# Patient Record
Sex: FEMALE | NOT HISPANIC OR LATINO
[De-identification: names, ages, dates, MRNs, and addresses within clinical notes are randomized per-mention and may not be internally consistent; named-entity substitution may affect disease eponyms.]

---

## 2017-02-25 ENCOUNTER — APPOINTMENT (OUTPATIENT)
Dept: INTERNAL MEDICINE | Facility: CLINIC | Age: 82
End: 2017-02-25

## 2017-09-05 ENCOUNTER — TRANSCRIPTION ENCOUNTER (OUTPATIENT)
Age: 82
End: 2017-09-05

## 2017-09-07 ENCOUNTER — INPATIENT (INPATIENT)
Facility: HOSPITAL | Age: 82
LOS: 5 days | Discharge: HOME HEALTH | End: 2017-09-13
Attending: HOSPITALIST | Admitting: HOSPITALIST
Payer: MEDICARE

## 2017-09-07 VITALS
DIASTOLIC BLOOD PRESSURE: 72 MMHG | OXYGEN SATURATION: 100 % | HEART RATE: 115 BPM | RESPIRATION RATE: 18 BRPM | TEMPERATURE: 99 F | SYSTOLIC BLOOD PRESSURE: 144 MMHG

## 2017-09-07 DIAGNOSIS — E87.1 HYPO-OSMOLALITY AND HYPONATREMIA: ICD-10-CM

## 2017-09-07 DIAGNOSIS — G93.41 METABOLIC ENCEPHALOPATHY: ICD-10-CM

## 2017-09-07 DIAGNOSIS — I48.91 UNSPECIFIED ATRIAL FIBRILLATION: ICD-10-CM

## 2017-09-07 DIAGNOSIS — H40.9 UNSPECIFIED GLAUCOMA: ICD-10-CM

## 2017-09-07 DIAGNOSIS — E83.42 HYPOMAGNESEMIA: ICD-10-CM

## 2017-09-07 DIAGNOSIS — A41.9 SEPSIS, UNSPECIFIED ORGANISM: ICD-10-CM

## 2017-09-07 DIAGNOSIS — Z29.9 ENCOUNTER FOR PROPHYLACTIC MEASURES, UNSPECIFIED: ICD-10-CM

## 2017-09-07 DIAGNOSIS — R80.8 OTHER PROTEINURIA: ICD-10-CM

## 2017-09-07 DIAGNOSIS — I10 ESSENTIAL (PRIMARY) HYPERTENSION: ICD-10-CM

## 2017-09-07 DIAGNOSIS — R31.9 HEMATURIA, UNSPECIFIED: ICD-10-CM

## 2017-09-07 DIAGNOSIS — E11.9 TYPE 2 DIABETES MELLITUS WITHOUT COMPLICATIONS: ICD-10-CM

## 2017-09-07 DIAGNOSIS — Z98.890 OTHER SPECIFIED POSTPROCEDURAL STATES: Chronic | ICD-10-CM

## 2017-09-07 DIAGNOSIS — I25.10 ATHEROSCLEROTIC HEART DISEASE OF NATIVE CORONARY ARTERY WITHOUT ANGINA PECTORIS: ICD-10-CM

## 2017-09-07 DIAGNOSIS — N39.0 URINARY TRACT INFECTION, SITE NOT SPECIFIED: ICD-10-CM

## 2017-09-07 LAB
ACANTHOCYTES BLD QL SMEAR: SLIGHT — SIGNIFICANT CHANGE UP
ALBUMIN SERPL ELPH-MCNC: 3 G/DL — LOW (ref 3.3–5)
ALP SERPL-CCNC: 81 U/L — SIGNIFICANT CHANGE UP (ref 40–120)
ALT FLD-CCNC: 29 U/L — SIGNIFICANT CHANGE UP (ref 4–33)
ANISOCYTOSIS BLD QL: SLIGHT — SIGNIFICANT CHANGE UP
APPEARANCE UR: CLEAR — SIGNIFICANT CHANGE UP
AST SERPL-CCNC: 34 U/L — HIGH (ref 4–32)
BACTERIA # UR AUTO: SIGNIFICANT CHANGE UP
BASE EXCESS BLDV CALC-SCNC: -0.3 MMOL/L — SIGNIFICANT CHANGE UP
BASOPHILS # BLD AUTO: 0.02 K/UL — SIGNIFICANT CHANGE UP (ref 0–0.2)
BASOPHILS NFR BLD AUTO: 0.2 % — SIGNIFICANT CHANGE UP (ref 0–2)
BASOPHILS NFR SPEC: 0.9 % — SIGNIFICANT CHANGE UP (ref 0–2)
BILIRUB SERPL-MCNC: 0.7 MG/DL — SIGNIFICANT CHANGE UP (ref 0.2–1.2)
BILIRUB UR-MCNC: NEGATIVE — SIGNIFICANT CHANGE UP
BLOOD GAS VENOUS - CREATININE: 1.06 MG/DL — SIGNIFICANT CHANGE UP (ref 0.5–1.3)
BLOOD UR QL VISUAL: HIGH
BUN SERPL-MCNC: 10 MG/DL — SIGNIFICANT CHANGE UP (ref 7–23)
BUN SERPL-MCNC: 12 MG/DL — SIGNIFICANT CHANGE UP (ref 7–23)
BUN SERPL-MCNC: 19 MG/DL — SIGNIFICANT CHANGE UP (ref 7–23)
BURR CELLS BLD QL SMEAR: PRESENT — SIGNIFICANT CHANGE UP
CALCIUM SERPL-MCNC: 6.2 MG/DL — CRITICAL LOW (ref 8.4–10.5)
CALCIUM SERPL-MCNC: 8.3 MG/DL — LOW (ref 8.4–10.5)
CALCIUM SERPL-MCNC: 9 MG/DL — SIGNIFICANT CHANGE UP (ref 8.4–10.5)
CHLORIDE BLDV-SCNC: 89 MMOL/L — LOW (ref 96–108)
CHLORIDE SERPL-SCNC: 104 MMOL/L — SIGNIFICANT CHANGE UP (ref 98–107)
CHLORIDE SERPL-SCNC: 88 MMOL/L — LOW (ref 98–107)
CHLORIDE SERPL-SCNC: 92 MMOL/L — LOW (ref 98–107)
CK MB BLD-MCNC: 1.65 NG/ML — SIGNIFICANT CHANGE UP (ref 1–4.7)
CK MB BLD-MCNC: 1.9 NG/ML — SIGNIFICANT CHANGE UP (ref 1–4.7)
CK MB BLD-MCNC: SIGNIFICANT CHANGE UP (ref 0–2.5)
CK SERPL-CCNC: 106 U/L — SIGNIFICANT CHANGE UP (ref 25–170)
CK SERPL-CCNC: 68 U/L — SIGNIFICANT CHANGE UP (ref 25–170)
CO2 SERPL-SCNC: 19 MMOL/L — LOW (ref 22–31)
CO2 SERPL-SCNC: 21 MMOL/L — LOW (ref 22–31)
CO2 SERPL-SCNC: 23 MMOL/L — SIGNIFICANT CHANGE UP (ref 22–31)
COLOR SPEC: YELLOW — SIGNIFICANT CHANGE UP
CREAT SERPL-MCNC: 0.69 MG/DL — SIGNIFICANT CHANGE UP (ref 0.5–1.3)
CREAT SERPL-MCNC: 0.92 MG/DL — SIGNIFICANT CHANGE UP (ref 0.5–1.3)
CREAT SERPL-MCNC: 1.08 MG/DL — SIGNIFICANT CHANGE UP (ref 0.5–1.3)
EOSINOPHIL # BLD AUTO: 0 K/UL — SIGNIFICANT CHANGE UP (ref 0–0.5)
EOSINOPHIL NFR BLD AUTO: 0 % — SIGNIFICANT CHANGE UP (ref 0–6)
EOSINOPHIL NFR FLD: 0 % — SIGNIFICANT CHANGE UP (ref 0–6)
GAS PNL BLDV: 120 MMOL/L — CRITICAL LOW (ref 136–146)
GIANT PLATELETS BLD QL SMEAR: PRESENT — SIGNIFICANT CHANGE UP
GLUCOSE BLDV-MCNC: 170 — HIGH (ref 70–99)
GLUCOSE SERPL-MCNC: 141 MG/DL — HIGH (ref 70–99)
GLUCOSE SERPL-MCNC: 159 MG/DL — HIGH (ref 70–99)
GLUCOSE SERPL-MCNC: 177 MG/DL — HIGH (ref 70–99)
GLUCOSE UR-MCNC: NEGATIVE — SIGNIFICANT CHANGE UP
HBA1C BLD-MCNC: 6.7 % — HIGH (ref 4–5.6)
HCO3 BLDV-SCNC: 24 MMOL/L — SIGNIFICANT CHANGE UP (ref 20–27)
HCT VFR BLD CALC: 25.1 % — LOW (ref 34.5–45)
HCT VFR BLD CALC: 29.5 % — LOW (ref 34.5–45)
HCT VFR BLD CALC: 31.2 % — LOW (ref 34.5–45)
HCT VFR BLDV CALC: 33.5 % — LOW (ref 34.5–45)
HGB BLD-MCNC: 10 G/DL — LOW (ref 11.5–15.5)
HGB BLD-MCNC: 10.7 G/DL — LOW (ref 11.5–15.5)
HGB BLD-MCNC: 8.5 G/DL — LOW (ref 11.5–15.5)
HGB BLDV-MCNC: 10.9 G/DL — LOW (ref 11.5–15.5)
HYPOCHROMIA BLD QL: SIGNIFICANT CHANGE UP
IMM GRANULOCYTES # BLD AUTO: 0.04 # — SIGNIFICANT CHANGE UP
IMM GRANULOCYTES NFR BLD AUTO: 0.4 % — SIGNIFICANT CHANGE UP (ref 0–1.5)
KETONES UR-MCNC: SIGNIFICANT CHANGE UP
LACTATE BLDV-MCNC: 1.8 MMOL/L — SIGNIFICANT CHANGE UP (ref 0.5–2)
LEUKOCYTE ESTERASE UR-ACNC: HIGH
LYMPHOCYTES # BLD AUTO: 0.54 K/UL — LOW (ref 1–3.3)
LYMPHOCYTES # BLD AUTO: 4.9 % — LOW (ref 13–44)
LYMPHOCYTES NFR SPEC AUTO: 5.2 % — LOW (ref 13–44)
MACROCYTES BLD QL: SLIGHT — SIGNIFICANT CHANGE UP
MAGNESIUM SERPL-MCNC: 1 MG/DL — CRITICAL LOW (ref 1.6–2.6)
MAGNESIUM SERPL-MCNC: 1.4 MG/DL — LOW (ref 1.6–2.6)
MCHC RBC-ENTMCNC: 25.8 PG — LOW (ref 27–34)
MCHC RBC-ENTMCNC: 26.1 PG — LOW (ref 27–34)
MCHC RBC-ENTMCNC: 26.4 PG — LOW (ref 27–34)
MCHC RBC-ENTMCNC: 33.9 % — SIGNIFICANT CHANGE UP (ref 32–36)
MCHC RBC-ENTMCNC: 33.9 % — SIGNIFICANT CHANGE UP (ref 32–36)
MCHC RBC-ENTMCNC: 34.3 % — SIGNIFICANT CHANGE UP (ref 32–36)
MCV RBC AUTO: 76 FL — LOW (ref 80–100)
MCV RBC AUTO: 76.8 FL — LOW (ref 80–100)
MCV RBC AUTO: 77 FL — LOW (ref 80–100)
METHOD TYPE: SIGNIFICANT CHANGE UP
MICROCYTES BLD QL: SLIGHT — SIGNIFICANT CHANGE UP
MONOCYTES # BLD AUTO: 1.21 K/UL — HIGH (ref 0–0.9)
MONOCYTES NFR BLD AUTO: 10.9 % — SIGNIFICANT CHANGE UP (ref 2–14)
MONOCYTES NFR BLD: 4.4 % — SIGNIFICANT CHANGE UP (ref 2–9)
MUCOUS THREADS # UR AUTO: SIGNIFICANT CHANGE UP
NEUTROPHIL AB SER-ACNC: 86 % — HIGH (ref 43–77)
NEUTROPHILS # BLD AUTO: 9.28 K/UL — HIGH (ref 1.8–7.4)
NEUTROPHILS NFR BLD AUTO: 83.6 % — HIGH (ref 43–77)
NEUTS BAND # BLD: 3.5 % — SIGNIFICANT CHANGE UP (ref 0–6)
NITRITE UR-MCNC: NEGATIVE — SIGNIFICANT CHANGE UP
NRBC # FLD: 0 — SIGNIFICANT CHANGE UP
ORGANISM # SPEC MICROSCOPIC CNT: SIGNIFICANT CHANGE UP
ORGANISM # SPEC MICROSCOPIC CNT: SIGNIFICANT CHANGE UP
OVALOCYTES BLD QL SMEAR: SLIGHT — SIGNIFICANT CHANGE UP
PCO2 BLDV: 38 MMHG — LOW (ref 41–51)
PH BLDV: 7.41 PH — SIGNIFICANT CHANGE UP (ref 7.32–7.43)
PH UR: 7 — SIGNIFICANT CHANGE UP (ref 4.6–8)
PLATELET # BLD AUTO: 147 K/UL — LOW (ref 150–400)
PLATELET # BLD AUTO: 170 K/UL — SIGNIFICANT CHANGE UP (ref 150–400)
PLATELET # BLD AUTO: 170 K/UL — SIGNIFICANT CHANGE UP (ref 150–400)
PLATELET COUNT - ESTIMATE: NORMAL — SIGNIFICANT CHANGE UP
PMV BLD: 11.4 FL — SIGNIFICANT CHANGE UP (ref 7–13)
PMV BLD: 11.5 FL — SIGNIFICANT CHANGE UP (ref 7–13)
PMV BLD: 11.9 FL — SIGNIFICANT CHANGE UP (ref 7–13)
PO2 BLDV: 36 MMHG — SIGNIFICANT CHANGE UP (ref 35–40)
POIKILOCYTOSIS BLD QL AUTO: SLIGHT — SIGNIFICANT CHANGE UP
POTASSIUM BLDV-SCNC: 3.8 MMOL/L — SIGNIFICANT CHANGE UP (ref 3.4–4.5)
POTASSIUM SERPL-MCNC: 2.9 MMOL/L — CRITICAL LOW (ref 3.5–5.3)
POTASSIUM SERPL-MCNC: 3.6 MMOL/L — SIGNIFICANT CHANGE UP (ref 3.5–5.3)
POTASSIUM SERPL-MCNC: 3.8 MMOL/L — SIGNIFICANT CHANGE UP (ref 3.5–5.3)
POTASSIUM SERPL-SCNC: 2.9 MMOL/L — CRITICAL LOW (ref 3.5–5.3)
POTASSIUM SERPL-SCNC: 3.6 MMOL/L — SIGNIFICANT CHANGE UP (ref 3.5–5.3)
POTASSIUM SERPL-SCNC: 3.8 MMOL/L — SIGNIFICANT CHANGE UP (ref 3.5–5.3)
PROT SERPL-MCNC: 7 G/DL — SIGNIFICANT CHANGE UP (ref 6–8.3)
PROT UR-MCNC: 100 — HIGH
RBC # BLD: 3.26 M/UL — LOW (ref 3.8–5.2)
RBC # BLD: 3.88 M/UL — SIGNIFICANT CHANGE UP (ref 3.8–5.2)
RBC # BLD: 4.06 M/UL — SIGNIFICANT CHANGE UP (ref 3.8–5.2)
RBC # FLD: 15.7 % — HIGH (ref 10.3–14.5)
RBC # FLD: 16 % — HIGH (ref 10.3–14.5)
RBC # FLD: 16.1 % — HIGH (ref 10.3–14.5)
RBC CASTS # UR COMP ASSIST: HIGH (ref 0–?)
SAO2 % BLDV: 70.9 % — SIGNIFICANT CHANGE UP (ref 60–85)
SCHISTOCYTES BLD QL AUTO: SLIGHT — SIGNIFICANT CHANGE UP
SODIUM SERPL-SCNC: 125 MMOL/L — LOW (ref 135–145)
SODIUM SERPL-SCNC: 127 MMOL/L — LOW (ref 135–145)
SODIUM SERPL-SCNC: 135 MMOL/L — SIGNIFICANT CHANGE UP (ref 135–145)
SP GR SPEC: 1.01 — SIGNIFICANT CHANGE UP (ref 1–1.03)
SPECIMEN SOURCE: SIGNIFICANT CHANGE UP
SQUAMOUS # UR AUTO: SIGNIFICANT CHANGE UP
TROPONIN T SERPL-MCNC: < 0.06 NG/ML — SIGNIFICANT CHANGE UP (ref 0–0.06)
TROPONIN T SERPL-MCNC: < 0.06 NG/ML — SIGNIFICANT CHANGE UP (ref 0–0.06)
UROBILINOGEN FLD QL: NORMAL E.U. — SIGNIFICANT CHANGE UP (ref 0.1–0.2)
WBC # BLD: 10.85 K/UL — HIGH (ref 3.8–10.5)
WBC # BLD: 11.09 K/UL — HIGH (ref 3.8–10.5)
WBC # BLD: 9.02 K/UL — SIGNIFICANT CHANGE UP (ref 3.8–10.5)
WBC # FLD AUTO: 10.85 K/UL — HIGH (ref 3.8–10.5)
WBC # FLD AUTO: 11.09 K/UL — HIGH (ref 3.8–10.5)
WBC # FLD AUTO: 9.02 K/UL — SIGNIFICANT CHANGE UP (ref 3.8–10.5)
WBC UR QL: SIGNIFICANT CHANGE UP (ref 0–?)

## 2017-09-07 PROCEDURE — 99223 1ST HOSP IP/OBS HIGH 75: CPT

## 2017-09-07 PROCEDURE — 71010: CPT | Mod: 26

## 2017-09-07 PROCEDURE — 93010 ELECTROCARDIOGRAM REPORT: CPT

## 2017-09-07 RX ORDER — METFORMIN HYDROCHLORIDE 850 MG/1
1 TABLET ORAL
Qty: 0 | Refills: 0 | COMMUNITY

## 2017-09-07 RX ORDER — METOPROLOL TARTRATE 50 MG
25 TABLET ORAL ONCE
Qty: 0 | Refills: 0 | Status: COMPLETED | OUTPATIENT
Start: 2017-09-07 | End: 2017-09-07

## 2017-09-07 RX ORDER — AMLODIPINE BESYLATE 2.5 MG/1
5 TABLET ORAL DAILY
Qty: 0 | Refills: 0 | Status: DISCONTINUED | OUTPATIENT
Start: 2017-09-07 | End: 2017-09-13

## 2017-09-07 RX ORDER — INSULIN LISPRO 100/ML
VIAL (ML) SUBCUTANEOUS AT BEDTIME
Qty: 0 | Refills: 0 | Status: DISCONTINUED | OUTPATIENT
Start: 2017-09-07 | End: 2017-09-13

## 2017-09-07 RX ORDER — ASPIRIN/CALCIUM CARB/MAGNESIUM 324 MG
81 TABLET ORAL DAILY
Qty: 0 | Refills: 0 | Status: DISCONTINUED | OUTPATIENT
Start: 2017-09-07 | End: 2017-09-13

## 2017-09-07 RX ORDER — SODIUM CHLORIDE 9 MG/ML
1000 INJECTION, SOLUTION INTRAVENOUS
Qty: 0 | Refills: 0 | Status: DISCONTINUED | OUTPATIENT
Start: 2017-09-07 | End: 2017-09-13

## 2017-09-07 RX ORDER — DEXTROSE 50 % IN WATER 50 %
25 SYRINGE (ML) INTRAVENOUS ONCE
Qty: 0 | Refills: 0 | Status: DISCONTINUED | OUTPATIENT
Start: 2017-09-07 | End: 2017-09-13

## 2017-09-07 RX ORDER — METOPROLOL TARTRATE 50 MG
75 TABLET ORAL
Qty: 0 | Refills: 0 | Status: DISCONTINUED | OUTPATIENT
Start: 2017-09-07 | End: 2017-09-07

## 2017-09-07 RX ORDER — IPRATROPIUM BROMIDE 0.2 MG/ML
500 SOLUTION, NON-ORAL INHALATION ONCE
Qty: 0 | Refills: 0 | Status: COMPLETED | OUTPATIENT
Start: 2017-09-07 | End: 2017-09-07

## 2017-09-07 RX ORDER — GLUCAGON INJECTION, SOLUTION 0.5 MG/.1ML
1 INJECTION, SOLUTION SUBCUTANEOUS ONCE
Qty: 0 | Refills: 0 | Status: DISCONTINUED | OUTPATIENT
Start: 2017-09-07 | End: 2017-09-13

## 2017-09-07 RX ORDER — IPRATROPIUM BROMIDE 0.2 MG/ML
500 SOLUTION, NON-ORAL INHALATION EVERY 6 HOURS
Qty: 0 | Refills: 0 | Status: DISCONTINUED | OUTPATIENT
Start: 2017-09-07 | End: 2017-09-08

## 2017-09-07 RX ORDER — PIPERACILLIN AND TAZOBACTAM 4; .5 G/20ML; G/20ML
3.38 INJECTION, POWDER, LYOPHILIZED, FOR SOLUTION INTRAVENOUS ONCE
Qty: 0 | Refills: 0 | Status: COMPLETED | OUTPATIENT
Start: 2017-09-07 | End: 2017-09-07

## 2017-09-07 RX ORDER — LATANOPROST 0.05 MG/ML
1 SOLUTION/ DROPS OPHTHALMIC; TOPICAL AT BEDTIME
Qty: 0 | Refills: 0 | Status: DISCONTINUED | OUTPATIENT
Start: 2017-09-07 | End: 2017-09-13

## 2017-09-07 RX ORDER — SODIUM CHLORIDE 9 MG/ML
2400 INJECTION INTRAMUSCULAR; INTRAVENOUS; SUBCUTANEOUS ONCE
Qty: 0 | Refills: 0 | Status: COMPLETED | OUTPATIENT
Start: 2017-09-07 | End: 2017-09-07

## 2017-09-07 RX ORDER — LANOLIN ALCOHOL/MO/W.PET/CERES
3 CREAM (GRAM) TOPICAL AT BEDTIME
Qty: 0 | Refills: 0 | Status: DISCONTINUED | OUTPATIENT
Start: 2017-09-07 | End: 2017-09-13

## 2017-09-07 RX ORDER — DEXTROSE 50 % IN WATER 50 %
1 SYRINGE (ML) INTRAVENOUS ONCE
Qty: 0 | Refills: 0 | Status: DISCONTINUED | OUTPATIENT
Start: 2017-09-07 | End: 2017-09-13

## 2017-09-07 RX ORDER — METOPROLOL TARTRATE 50 MG
5 TABLET ORAL ONCE
Qty: 0 | Refills: 0 | Status: COMPLETED | OUTPATIENT
Start: 2017-09-07 | End: 2017-09-07

## 2017-09-07 RX ORDER — INSULIN LISPRO 100/ML
VIAL (ML) SUBCUTANEOUS
Qty: 0 | Refills: 0 | Status: DISCONTINUED | OUTPATIENT
Start: 2017-09-07 | End: 2017-09-13

## 2017-09-07 RX ORDER — MAGNESIUM SULFATE 500 MG/ML
1 VIAL (ML) INJECTION ONCE
Qty: 0 | Refills: 0 | Status: COMPLETED | OUTPATIENT
Start: 2017-09-07 | End: 2017-09-07

## 2017-09-07 RX ORDER — ACETAMINOPHEN 500 MG
1000 TABLET ORAL ONCE
Qty: 0 | Refills: 0 | Status: COMPLETED | OUTPATIENT
Start: 2017-09-07 | End: 2017-09-07

## 2017-09-07 RX ORDER — DORZOLAMIDE HYDROCHLORIDE 20 MG/ML
1 SOLUTION/ DROPS OPHTHALMIC
Qty: 0 | Refills: 0 | Status: DISCONTINUED | OUTPATIENT
Start: 2017-09-07 | End: 2017-09-13

## 2017-09-07 RX ORDER — APIXABAN 2.5 MG/1
5 TABLET, FILM COATED ORAL EVERY 12 HOURS
Qty: 0 | Refills: 0 | Status: DISCONTINUED | OUTPATIENT
Start: 2017-09-07 | End: 2017-09-13

## 2017-09-07 RX ORDER — DEXTROSE 50 % IN WATER 50 %
12.5 SYRINGE (ML) INTRAVENOUS ONCE
Qty: 0 | Refills: 0 | Status: DISCONTINUED | OUTPATIENT
Start: 2017-09-07 | End: 2017-09-13

## 2017-09-07 RX ORDER — SODIUM CHLORIDE 9 MG/ML
1000 INJECTION INTRAMUSCULAR; INTRAVENOUS; SUBCUTANEOUS
Qty: 0 | Refills: 0 | Status: DISCONTINUED | OUTPATIENT
Start: 2017-09-07 | End: 2017-09-13

## 2017-09-07 RX ORDER — VANCOMYCIN HCL 1 G
1000 VIAL (EA) INTRAVENOUS ONCE
Qty: 0 | Refills: 0 | Status: COMPLETED | OUTPATIENT
Start: 2017-09-07 | End: 2017-09-07

## 2017-09-07 RX ORDER — PILOCARPINE HCL 4 %
1 DROPS OPHTHALMIC (EYE)
Qty: 0 | Refills: 0 | Status: DISCONTINUED | OUTPATIENT
Start: 2017-09-07 | End: 2017-09-13

## 2017-09-07 RX ORDER — CEFTRIAXONE 500 MG/1
1 INJECTION, POWDER, FOR SOLUTION INTRAMUSCULAR; INTRAVENOUS EVERY 24 HOURS
Qty: 0 | Refills: 0 | Status: DISCONTINUED | OUTPATIENT
Start: 2017-09-07 | End: 2017-09-08

## 2017-09-07 RX ORDER — METOPROLOL TARTRATE 50 MG
100 TABLET ORAL
Qty: 0 | Refills: 0 | Status: DISCONTINUED | OUTPATIENT
Start: 2017-09-07 | End: 2017-09-08

## 2017-09-07 RX ORDER — LISINOPRIL 2.5 MG/1
40 TABLET ORAL DAILY
Qty: 0 | Refills: 0 | Status: DISCONTINUED | OUTPATIENT
Start: 2017-09-07 | End: 2017-09-13

## 2017-09-07 RX ORDER — ATORVASTATIN CALCIUM 80 MG/1
80 TABLET, FILM COATED ORAL AT BEDTIME
Qty: 0 | Refills: 0 | Status: DISCONTINUED | OUTPATIENT
Start: 2017-09-07 | End: 2017-09-13

## 2017-09-07 RX ORDER — ACETAMINOPHEN 500 MG
650 TABLET ORAL ONCE
Qty: 0 | Refills: 0 | Status: COMPLETED | OUTPATIENT
Start: 2017-09-07 | End: 2017-09-07

## 2017-09-07 RX ADMIN — SODIUM CHLORIDE 75 MILLILITER(S): 9 INJECTION INTRAMUSCULAR; INTRAVENOUS; SUBCUTANEOUS at 10:09

## 2017-09-07 RX ADMIN — Medication 100 MILLIGRAM(S): at 17:28

## 2017-09-07 RX ADMIN — LATANOPROST 1 DROP(S): 0.05 SOLUTION/ DROPS OPHTHALMIC; TOPICAL at 21:35

## 2017-09-07 RX ADMIN — Medication 500 MICROGRAM(S): at 18:22

## 2017-09-07 RX ADMIN — ATORVASTATIN CALCIUM 80 MILLIGRAM(S): 80 TABLET, FILM COATED ORAL at 21:34

## 2017-09-07 RX ADMIN — Medication 25 MILLIGRAM(S): at 10:30

## 2017-09-07 RX ADMIN — DORZOLAMIDE HYDROCHLORIDE 1 DROP(S): 20 SOLUTION/ DROPS OPHTHALMIC at 21:35

## 2017-09-07 RX ADMIN — Medication 5 MILLIGRAM(S): at 06:04

## 2017-09-07 RX ADMIN — Medication 650 MILLIGRAM(S): at 17:28

## 2017-09-07 RX ADMIN — SODIUM CHLORIDE 75 MILLILITER(S): 9 INJECTION INTRAMUSCULAR; INTRAVENOUS; SUBCUTANEOUS at 19:14

## 2017-09-07 RX ADMIN — Medication 3 MILLIGRAM(S): at 23:26

## 2017-09-07 RX ADMIN — CEFTRIAXONE 100 GRAM(S): 500 INJECTION, POWDER, FOR SOLUTION INTRAMUSCULAR; INTRAVENOUS at 10:30

## 2017-09-07 RX ADMIN — PIPERACILLIN AND TAZOBACTAM 200 GRAM(S): 4; .5 INJECTION, POWDER, LYOPHILIZED, FOR SOLUTION INTRAVENOUS at 04:50

## 2017-09-07 RX ADMIN — Medication 250 MILLIGRAM(S): at 04:50

## 2017-09-07 RX ADMIN — Medication 500 MICROGRAM(S): at 22:15

## 2017-09-07 RX ADMIN — Medication 5 MILLIGRAM(S): at 13:20

## 2017-09-07 RX ADMIN — SODIUM CHLORIDE 800 MILLILITER(S): 9 INJECTION INTRAMUSCULAR; INTRAVENOUS; SUBCUTANEOUS at 04:38

## 2017-09-07 RX ADMIN — Medication 100 GRAM(S): at 21:34

## 2017-09-07 RX ADMIN — Medication 1 DROP(S): at 21:35

## 2017-09-07 RX ADMIN — Medication 81 MILLIGRAM(S): at 13:06

## 2017-09-07 RX ADMIN — Medication 400 MILLIGRAM(S): at 04:37

## 2017-09-07 RX ADMIN — APIXABAN 5 MILLIGRAM(S): 2.5 TABLET, FILM COATED ORAL at 17:28

## 2017-09-07 RX ADMIN — Medication 25 MILLIGRAM(S): at 13:25

## 2017-09-07 NOTE — CONSULT NOTE ADULT - SUBJECTIVE AND OBJECTIVE BOX
CHIEF COMPLAINT:   83 y/o female with PMHx of Atrial fibrillation on Eliquis, ischemic cardiomyopathy (EF 52%), HTN, HLD, Diabetes, and glaucoma presents to the ED with exertional dyspnea and confusion x 2 days. Dyspnea worsened at 12:30am while patient was walking to the bathroom. She states that she felt weak and shaky with mild palpitations and she could not catch her breath. Patient also has had a cough with white sputum production for the past 2-3 days, no sick contacts at home. Daughter reports that patient has been hallucinating intermittently, mostly in the afternoon, and that she has not been eating or drinking much due to decreased appetite and early satiety with a weight loss of 8-10lbs in the past 3 months. Patient has also been feeling warmer recently and complains of headache and dysuria for the past few days. Patient went to urgent care on 9/5/17 where all blood results were normal except for hyponatremia, but urine was not collected. Upon arrival to the ED, patient found to be in Afib with HR in the 130s and was given IV fluids (2.4L NS) and Metoprolol, HR now stable. Patient also found to have UTI and was given Vancomycin and Zosyn for possible sepsis. Blood cultures and urine cultures ordered. Denies fever, chills, night sweats, dizziness, recent falls, visual deficits, chest pain, hematuria, flank pain, abdominal pain, nausea, vomiting, diarrhea, or peripheral edema.    HISTORY OF PRESENT ILLNESS:    PAST MEDICAL & SURGICAL HISTORY:  CAD (coronary artery disease)  Type 2 diabetes mellitus without complication, unspecified long term insulin use status  Atrial fibrillation with rapid ventricular response  Glaucoma  Vertigo  HTN (hypertension)  History of eye surgery        MEDICATIONS:  aspirin enteric coated 81 milliGRAM(s) Oral daily  lisinopril 40 milliGRAM(s) Oral daily  apixaban 5 milliGRAM(s) Oral every 12 hours  amLODIPine   Tablet 5 milliGRAM(s) Oral daily  metoprolol 100 milliGRAM(s) Oral two times a day  cefTRIAXone   IVPB 1 Gram(s) IV Intermittent every 24 hours  ipratropium  for Nebulization. 500 MICROGram(s) Nebulizer once        atorvastatin 80 milliGRAM(s) Oral at bedtime  insulin lispro (HumaLOG) corrective regimen sliding scale   SubCutaneous three times a day before meals  insulin lispro (HumaLOG) corrective regimen sliding scale   SubCutaneous at bedtime  dextrose Gel 1 Dose(s) Oral once PRN  dextrose 50% Injectable 12.5 Gram(s) IV Push once  dextrose 50% Injectable 25 Gram(s) IV Push once  dextrose 50% Injectable 25 Gram(s) IV Push once  glucagon  Injectable 1 milliGRAM(s) IntraMuscular once PRN    dorzolamide 2% Ophthalmic Solution 1 Drop(s) Both EYES two times a day  pilocarpine 1% Solution 1 Drop(s) Both EYES two times a day  latanoprost 0.005% Ophthalmic Solution 1 Drop(s) Both EYES at bedtime  dextrose 5%. 1000 milliLiter(s) IV Continuous <Continuous>  sodium chloride 0.9%. 1000 milliLiter(s) IV Continuous <Continuous>      FAMILY HISTORY:  No pertinent family history in first degree relatives      SOCIAL HISTORY:    [ ] Non-smoker  [ ] Smoker  [ ] Alcohol    Allergies    No Known Allergies    Intolerances      PHYSICAL EXAM:  T(C): 36.8 (09-07-17 @ 10:03), Max: 37.4 (09-07-17 @ 03:20)  HR: 136 (09-07-17 @ 12:45) (97 - 136)  BP: 156/84 (09-07-17 @ 12:45) (134/76 - 170/93)  RR: 25 (09-07-17 @ 12:45) (18 - 31)  SpO2: 100% (09-07-17 @ 12:45) (96% - 100%)  Wt(kg): --  I&O's Summary      Appearance: Normal	  HEENT:   Normal oral mucosa, PERRL, EOMI	  Cardiovascular: Normal S1 S2, No JVD, No murmurs, No edema, irregularlt irregular  Respiratory: scattered exp wheezing  Gastrointestinal:  Soft, Non-tender, + BS  Extremities: Normal range of motion, No clubbing, cyanosis or edema    CKMB: 1.65 ng/mL (09-07 @ 04:00)                              8.5   9.02  )-----------( 147      ( 07 Sep 2017 14:32 )             25.1     09-07    125<L>  |  88<L>  |  19  ----------------------------<  159<H>  3.8   |  21<L>  |  1.08    Ca    9.0      07 Sep 2017 04:00    TPro  7.0  /  Alb  3.0<L>  /  TBili  0.7  /  DBili  x   /  AST  34<H>  /  ALT  29  /  AlkPhos  81  09-07    proBNP:   Lipid Profile:   HgA1c: Hemoglobin A1C, Whole Blood: 6.7 % (09-07 @ 03:44)    TSH:     ASSESSMENT/PLAN:

## 2017-09-07 NOTE — ED PROVIDER NOTE - MEDICAL DECISION MAKING DETAILS
83 yo F w/ PMH of Diabetes, HTN, and A fib presenting with exertional dyspnea and altered mental status. Concern for sepsis. Will order sepsis bundle. Begin fluids and antibiotics. Patient to be admitted.

## 2017-09-07 NOTE — H&P ADULT - HISTORY OF PRESENT ILLNESS
81 y/o female with PMHx of Atrial fibrillation on Eliquis, ischemic cardiomyopathy (EF 52%), HTN, HLD, Diabetes, and glaucoma presents to the ED with exertional dyspnea and confusion x 2 days. Dyspnea worsened at 12:30am while patient was walking to the bathroom. She states that she felt weak and shaky with mild palpitations and she could not catch her breath. Patient also has had a cough with white sputum production for the past 2-3 days, no sick contacts at home. Daughter reports that patient has been hallucinating intermittently, mostly in the afternoon, and that she has not been eating or drinking much due to decreased appetite and early satiety with a weight loss of 8-10lbs in the past 3 months. Patient has also been feeling warmer recently and complains of headache and dysuria for the past few days. Patient went to urgent care on 9/5/17 where all blood results were normal except for hyponatremia, but urine was not collected. Upon arrival to the ED, patient found to be in Afib with HR in the 130s and was given IV fluids (2.4L NS) and Metoprolol, HR now stable. Patient also found to have UTI and was given Vancomycin and Zosyn for possible sepsis. Blood cultures and urine cultures ordered. Denies fever, chills, night sweats, dizziness, recent falls, visual deficits, chest pain, hematuria, flank pain, abdominal pain, nausea, vomiting, diarrhea, or peripheral edema.

## 2017-09-07 NOTE — ED ADULT NURSE NOTE - CHIEF COMPLAINT QUOTE
pt arrives via EMS w/ c/o weakness x 3 days. pt states has been having abdominal pain with nausea and vomiting when coughing. pt warm to touch and states has been feeling warm at home but never took temp. pt pmh a fib in eliquis, DM. pt appears comfortable and in NAD. FSx 161    addend: Charge RN aware pt starting to get SOb with rapid breathing.

## 2017-09-07 NOTE — ED ADULT NURSE NOTE - OBJECTIVE STATEMENT
Code sepsis called. Patient bought in room 12 via stretcher accompanied by family. Patient reports weakness, fatigue and shortness of breath last night while walking to the bathroom. Patient is alert and oriented x 4, tachypneic, wheezing noted, tachycardic on cardiac monitor, abdomen soft and nontender. +1 edema noted on bilateral lower extremities.20 gauge saline lock placed on left AC, 20 gauge saline lock placed on right metacarpal, blood drawn and sent. Urinalysis and urine culture sent. Will follow up and monitor.

## 2017-09-07 NOTE — PROVIDER CONTACT NOTE (CRITICAL VALUE NOTIFICATION) - ASSESSMENT
Vital signs stable. Tele monitoring in place. Patient in no acute distress. NS 0.9% infusing as per orders. Vital signs stable. Tele monitoring in place. Patient in no acute distress.

## 2017-09-07 NOTE — H&P ADULT - PROBLEM SELECTOR PLAN 2
Likely multifactorial due to UTI, hyponatremia, and Afib with RVR  Currently AAOx3 Likely multifactorial due to UTI, hyponatremia, and Afib with RVR  Currently AAOx3, currently resolved, no need for head CT

## 2017-09-07 NOTE — H&P ADULT - PROBLEM SELECTOR PLAN 1
Admit to telemetry  HR currently stable  Cardiac consult ordered  Echo ordered  Continue Metoprolol and Eliquis Admit to telemetry  HR currently stable  Cardiac consult ordered  Echo ordered  Continue Metoprolol and Eliquis  CHADS score 4

## 2017-09-07 NOTE — CONSULT NOTE ADULT - ASSESSMENT
83 y/o female with PMHx of Atrial fibrillation on Eliquis, ischemic cardiomyopathy (EF 52%), HTN, HLD, Diabetes, and glaucoma presents to the ED with exertional dyspnea and confusion x 2 days.

## 2017-09-07 NOTE — ED ADULT TRIAGE NOTE - CHIEF COMPLAINT QUOTE
pt arrives via EMS w/ c/o weakness x 3 days. pt states has been having abdominal pain with nausea and vomiting when coughing. pt warm to touch and states has been feeling warm at home but never took temp. pt pmh a fib in eliquis, DM. pt appears comfortable and in NAD. FSx 161 pt arrives via EMS w/ c/o weakness x 3 days. pt states has been having abdominal pain with nausea and vomiting when coughing. pt warm to touch and states has been feeling warm at home but never took temp. pt pmh a fib in eliquis, DM. pt appears comfortable and in NAD. FSx 161    addend: Charge RN aware pt starting to get SOb with rapid breathing.

## 2017-09-07 NOTE — PHYSICAL THERAPY INITIAL EVALUATION ADULT - GENERAL OBSERVATIONS, REHAB EVAL
Patient received supine in bed; No apparent distress. (+) oxygen via Nasal Canula, heplock. Daughter at bedside

## 2017-09-07 NOTE — ED PROVIDER NOTE - CONSTITUTIONAL, MLM
normal... Well nourished, awake, alert, oriented to person, place, time/situation and in no apparent distress. Patient AAO x 3, but had to spend a long time to give current month and year.

## 2017-09-07 NOTE — H&P ADULT - ASSESSMENT
81 y/o female with PMHx of atrial fibrillation on Eliquis, CAD (medically managed), ischemic cardiomyopathy with mild LV dysfunction (EF 52%), HTN, HLD, DM, and glaucoma presents to ED with shortness of breath and confusion for 2 days, found to be in rapid atrial fibrillation complicated by hyponatremia and possible UTI.

## 2017-09-07 NOTE — ED PROVIDER NOTE - ATTENDING CONTRIBUTION TO CARE
I performed a face to face bedside interview with patient regarding history of present illness, review of symptoms and past medical history. I completed an independent physical exam.  I have discussed patient's plan of care.   I agree with note as stated above, having amended the EMR as needed to reflect my findings. I have discussed the assessment and plan of care.  This includes during the time I functioned as the attending physician for this patient.  Attending Contribution to Care:agree with plan of resident. pt p/w sepsis secondary to uti. stable otherwise. vss. labs with mild leukocytosis. stable for admission. vss

## 2017-09-07 NOTE — CONSULT NOTE ADULT - ASSESSMENT
EKG - Afib RVR LVH  Echo 2016 - mild global LV dysfunction   Cath 2016 - Normal LM, LAD LCX RCA, D1 ostial 90%, treated medically     1) Afib RVR - continue eliquis, heart rate under better control, s/p lopressor IV 5mg , cont lopressor 100mg q12, repeat 2D echo    2_ UTI - on ceftriaxone     3) Wheezing - pulm consult

## 2017-09-07 NOTE — PROGRESS NOTE ADULT - SUBJECTIVE AND OBJECTIVE BOX
Patient is a 82y old  Female who presents with a chief complaint of SOB and confusion. Unable to walk (07 Sep 2017 10:36)    Called by RN to evaluate pt. with elevated heart rate on tele.     Vital Signs Last 24 Hrs  T(C): 36.8 (07 Sep 2017 10:03), Max: 37.4 (07 Sep 2017 03:20)  T(F): 98.2 (07 Sep 2017 10:03), Max: 99.3 (07 Sep 2017 03:20)  HR: 97 (07 Sep 2017 10:03) (97 - 132)  BP: 145/96 (07 Sep 2017 10:03) (134/76 - 170/93)  BP(mean): --  RR: 19 (07 Sep 2017 10:03) (18 - 31)  SpO2: 100% (07 Sep 2017 10:03) (96% - 100%)  CARDIAC MARKERS ( 07 Sep 2017 04:00 )  x     / < 0.06 ng/mL / 106 u/L / 1.65 ng/mL / x                                  10.7   11.09 )-----------( 170      ( 07 Sep 2017 04:00 )             31.2     09-07    125<L>  |  88<L>  |  19  ----------------------------<  159<H>  3.8   |  21<L>  |  1.08    Ca    9.0      07 Sep 2017 04:00    TPro  7.0  /  Alb  3.0<L>  /  TBili  0.7  /  DBili  x   /  AST  34<H>  /  ALT  29  /  AlkPhos  81  09-07    LIVER FUNCTIONS - ( 07 Sep 2017 04:00 )  Alb: 3.0 g/dL / Pro: 7.0 g/dL / ALK PHOS: 81 u/L / ALT: 29 u/L / AST: 34 u/L / GGT: x                                   CAPILLARY BLOOD GLUCOSE  147 (07 Sep 2017 11:39)  167 (07 Sep 2017 08:37)        aspirin enteric coated 81 milliGRAM(s) Oral daily  lisinopril 40 milliGRAM(s) Oral daily  atorvastatin 80 milliGRAM(s) Oral at bedtime  dorzolamide 2% Ophthalmic Solution 1 Drop(s) Both EYES two times a day  pilocarpine 1% Solution 1 Drop(s) Both EYES two times a day  apixaban 5 milliGRAM(s) Oral every 12 hours  latanoprost 0.005% Ophthalmic Solution 1 Drop(s) Both EYES at bedtime  amLODIPine   Tablet 5 milliGRAM(s) Oral daily  insulin lispro (HumaLOG) corrective regimen sliding scale   SubCutaneous three times a day before meals  insulin lispro (HumaLOG) corrective regimen sliding scale   SubCutaneous at bedtime  dextrose 5%. 1000 milliLiter(s) IV Continuous <Continuous>  dextrose Gel 1 Dose(s) Oral once PRN  dextrose 50% Injectable 12.5 Gram(s) IV Push once  dextrose 50% Injectable 25 Gram(s) IV Push once  dextrose 50% Injectable 25 Gram(s) IV Push once  glucagon  Injectable 1 milliGRAM(s) IntraMuscular once PRN  cefTRIAXone   IVPB 1 Gram(s) IV Intermittent every 24 hours  sodium chloride 0.9%. 1000 milliLiter(s) IV Continuous <Continuous>  metoprolol 100 milliGRAM(s) Oral two times a day      RADIOLOGY :    Assessment: Patient 82y with Sepsis  CAD (coronary artery disease)  Type 2 diabetes mellitus without complication, unspecified long term insulin use status  Atrial fibrillation with rapid ventricular response  Glaucoma  Vertigo  HTN (hypertension)  No pertinent past medical history  History of eye surgery  No significant past surgical history

## 2017-09-07 NOTE — H&P ADULT - ATTENDING COMMENTS
83 yo F w/ Afib on Eliquis, ICM (EF 52%, cath 11/2016), HTN, HLD, NIDDM presents with confusion, exertional dyspnea, and dysuria x 2 days. Per pt's daughter, pt was recently diagnosed with DM a few months ago and started on metformin, and since then has been having decreased appetite, nausea, and vomiting. Pt states that her PMD decreased metformin from BID to QD last month. Daughter states she hasn't given her metformin in the last few days. In the ED, afebrile, initially in Afib with RVR to 130s, given lopressor 5 mg IV with improvement in HR. HR currently in 100s-110s now. Will give additional 25 mg of metoprolol and increase home regimen from metoprolol 75 BID to 100 BID. BP stable. Monitor on tele. If HR does not improve, will order metoprolol IV PRN. Pt follows w/ Dr. Kerr (Cards) - called. c/w IVF hydration for likely hypovolemic hyponatremia. CTX for UTI. f/u urine culture, blood cultures. ISS. Check Hga1c. PT eval.

## 2017-09-07 NOTE — H&P ADULT - RS GEN PE MLT RESP DETAILS PC
no rales/no wheezes/respirations non-labored/clear to auscultation bilaterally/breath sounds equal/no chest wall tenderness

## 2017-09-07 NOTE — CONSULT NOTE ADULT - PROBLEM SELECTOR RECOMMENDATION 9
etiology?  r/o SIADH  Get serum and urine osmolality, Urine Na, TSH, serum cortisol in AM  Na level monitor Q12  Na level should not go more than 10meq in 24 hrs

## 2017-09-07 NOTE — ED PROVIDER NOTE - OBJECTIVE STATEMENT
83 yo F w/ PMH of HTN, A fib, and Diabetes, presenting with CC of exertional dyspnea and confusion. Patient is accompanied by two daughters and granddaughter. Patient was brought in by family after having dyspnea while walking to and from the bathroom around 12:30am on 09/07/2017. Patient became SOB while walking - this has not happened before. Family also states that she has seemed weaker than usual and been slightly confused recently. Over past few days, patient has been reaching for things that are not there and misplacing her eye drops. Because of this, patient went to urgent care on 09/05/2017, where labs were normal except for low sodium.     Dyspnea this evening was described as fast, shallow breathing. Patient has also had HA for 2 days, which came on gradually and described as mild. Patient has felt warmer than usual. Patient also had one episode of vomiting for 3 consecutive days - 09/04/17-09/06/17. Emesis described as clear or what the patient ate prior to the episode.     Patient denies chest pain, abdominal pain, changes in bowel movements, or changes in urine. 81 yo F w/ PMH of HTN, A fib, and Diabetes, presenting with CC of exertional dyspnea and confusion. Patient is accompanied by two daughters and granddaughter. Patient was brought in by family after having dyspnea while walking to and from the bathroom around 12:30am on 09/07/2017. Patient began breathing fast and shallow while walking - this has not happened before. Family also states that she has seemed weaker than usual and been slightly confused recently. Over past few days, patient has been reaching for things that are not there and misplacing her eye drops. Because of this, patient went to urgent care on 09/05/2017, where labs were normal except for low sodium.     Patient has also had HA for 2 days, which came on gradually and described as mild. Patient has felt warmer than usual. Patient also had one episode of vomiting for 3 consecutive days - 09/04/17-09/06/17. Emesis described as clear or what the patient ate prior to the episode.     Patient denies chest pain, abdominal pain, changes in bowel movements, or changes in urine.

## 2017-09-07 NOTE — PROGRESS NOTE ADULT - ASSESSMENT
Plan:  - metoprolol 5 mg IV x 1  - metoprolol 25 mg po x 1  - D/w Dr. Kerr, awaiting cardiology consult  - atrovent nebulizer  x 1    - Will continue to montior

## 2017-09-07 NOTE — H&P ADULT - PMH
Atrial fibrillation with rapid ventricular response    CAD (coronary artery disease)    Glaucoma    HTN (hypertension)    Type 2 diabetes mellitus without complication, unspecified long term insulin use status    Vertigo

## 2017-09-07 NOTE — H&P ADULT - PROBLEM SELECTOR PLAN 3
s/p Vanco and Zosyn in ED  Follow up urine culture and blood cultures s/p Vanco and Zosyn in ED  Follow up urine culture and blood cultures  Continue Ceftriaxone IV

## 2017-09-07 NOTE — PHYSICAL THERAPY INITIAL EVALUATION ADULT - PERTINENT HX OF CURRENT PROBLEM, REHAB EVAL
81 y/o female with PMHx of Atrial fibrillation on Eliquis, ischemic cardiomyopathy (EF 52%), HTN, HLD, Diabetes, and glaucoma presents to the ED with exertional dyspnea and confusion x 2 days.

## 2017-09-07 NOTE — ED PROVIDER NOTE - CARE PLAN
Principal Discharge DX:	Sepsis due to urinary tract infection  Secondary Diagnosis:	Atrial fibrillation with RVR

## 2017-09-07 NOTE — CONSULT NOTE ADULT - SUBJECTIVE AND OBJECTIVE BOX
Dr. Ramirez  Office [phone]  Cell [phone]  [place] [facility]  Cell [phone]    HPI:  81 y/o female with PMHx of Atrial fibrillation on Eliquis, ischemic cardiomyopathy (EF 52%), HTN, HLD, Diabetes, and glaucoma presents to the ED with exertional dyspnea and confusion x 2 days. Dyspnea worsened at 12:30am while patient was walking to the bathroom. She states that she felt weak and shaky with mild palpitations and she could not catch her breath. Patient also has had a cough with white sputum production for the past 2-3 days, no sick contacts at home. Daughter reports that patient has been hallucinating intermittently, mostly in the afternoon, and that she has not been eating or drinking much due to decreased appetite and early satiety with a weight loss of 8-10lbs in the past 3 months. Patient has also been feeling warmer recently and complains of headache and dysuria for the past few days. Patient went to urgent care on 17 where all blood results were normal except for hyponatremia, but urine was not collected. Upon arrival to the ED, patient found to be in Afib with HR in the 130s and was given IV fluids (2.4L NS) and Metoprolol, HR now stable. Patient also found to have UTI and was given Vancomycin and Zosyn for possible sepsis. Blood cultures and urine cultures ordered. Denies fever, chills, night sweats, dizziness, recent falls, visual deficits, chest pain, hematuria, flank pain, abdominal pain, nausea, vomiting, diarrhea, or peripheral edema. (07 Sep 2017 08:10)      Allergies:  No Known Allergies      PAST MEDICAL & SURGICAL HISTORY:  CAD (coronary artery disease)  Type 2 diabetes mellitus without complication, unspecified long term insulin use status  Atrial fibrillation with rapid ventricular response  Glaucoma  Vertigo  HTN (hypertension)  History of eye surgery      Home Medications Reviewed    Hospital Medications:   MEDICATIONS  (STANDING):  aspirin enteric coated 81 milliGRAM(s) Oral daily  lisinopril 40 milliGRAM(s) Oral daily  atorvastatin 80 milliGRAM(s) Oral at bedtime  dorzolamide 2% Ophthalmic Solution 1 Drop(s) Both EYES two times a day  pilocarpine 1% Solution 1 Drop(s) Both EYES two times a day  apixaban 5 milliGRAM(s) Oral every 12 hours  latanoprost 0.005% Ophthalmic Solution 1 Drop(s) Both EYES at bedtime  amLODIPine   Tablet 5 milliGRAM(s) Oral daily  insulin lispro (HumaLOG) corrective regimen sliding scale   SubCutaneous three times a day before meals  insulin lispro (HumaLOG) corrective regimen sliding scale   SubCutaneous at bedtime  dextrose 5%. 1000 milliLiter(s) (50 mL/Hr) IV Continuous <Continuous>  dextrose 50% Injectable 12.5 Gram(s) IV Push once  dextrose 50% Injectable 25 Gram(s) IV Push once  dextrose 50% Injectable 25 Gram(s) IV Push once  cefTRIAXone   IVPB 1 Gram(s) IV Intermittent every 24 hours  sodium chloride 0.9%. 1000 milliLiter(s) (75 mL/Hr) IV Continuous <Continuous>  metoprolol 100 milliGRAM(s) Oral two times a day  ipratropium    for Nebulization 500 MICROGram(s) Nebulizer every 6 hours      SOCIAL HISTORY:  Denies ETOh, Smoking,     FAMILY HISTORY:  No pertinent family history in first degree relatives      REVIEW OF SYSTEMS:  CONSTITUTIONAL: No weakness, fevers or chills  EYES/ENT: No visual changes;  No vertigo or throat pain   NECK: No pain or stiffness  RESPIRATORY: No cough, wheezing, hemoptysis; No shortness of breath  CARDIOVASCULAR: No chest pain or palpitations.  GASTROINTESTINAL: No abdominal or epigastric pain. No nausea, vomiting, or hematemesis; No diarrhea or constipation. No melena or hematochezia.  GENITOURINARY: No dysuria, frequency, foamy urine, urinary urgency, incontinence or hematuria  NEUROLOGICAL: No numbness or weakness  SKIN: No itching, burning, rashes, or lesions   VASCULAR: No bilateral lower extremity edema.   All other review of systems is negative unless indicated above.    VITALS:  T(F): 98.6 (17 @ 20:28), Max: 99.3 (17 @ 03:20)  HR: 101 (17 @ 22:16)  BP: 134/81 (17 @ 20:28)  RR: 18 (17 @ 20:28)  SpO2: 99% (17 @ 22:16)  Wt(kg): --    Height (cm): 160 ( @ :24)  Weight (kg): 71.214 ( @ 07:24)  BMI (kg/m2): 27.8 ( @ 07:24)  BSA (m2): 1.74 ( @ 07:24)    PHYSICAL EXAM:  Constitutional: NAD  HEENT: anicteric sclera, oropharynx clear, MMM  Neck: No JVD  Respiratory: CTAB, no wheezes, rales or rhonchi  Cardiovascular: S1, S2, RRR  Gastrointestinal: BS+, soft, NT/ND  Extremities: No cyanosis or clubbing. No peripheral edema  Neurological: A/O x 3, no focal deficits  Psychiatric: Normal mood, normal affect  : No CVA tenderness. No pinon.   Skin: No rashes      LABS:      127<L>  |  92<L>  |  12  ----------------------------<  177<H>  3.6   |  23  |  0.92    Ca    8.3<L>      07 Sep 2017 16:30  Mg     1.4         TPro  7.0  /  Alb  3.0<L>  /  TBili  0.7  /  DBili      /  AST  34<H>  /  ALT  29  /  AlkPhos  81      Creatinine Trend: 0.92 <--, 0.69 <--, 1.08 <--                        10.0   10.85 )-----------( 170      ( 07 Sep 2017 16:30 )             29.5     Urine Studies:  Urinalysis Basic - ( 07 Sep 2017 04:20 )    Color: YELLOW / Appearance: CLEAR / S.009 / pH: 7.0  Gluc: NEGATIVE / Ketone: TRACE  / Bili: NEGATIVE / Urobili: NORMAL E.U.   Blood: MODERATE / Protein: 100 / Nitrite: NEGATIVE   Leuk Esterase: LARGE / RBC: 5-10 / WBC 10-25   Sq Epi: OCC / Non Sq Epi:  / Bacteria: FEW          RADIOLOGY & ADDITIONAL STUDIES:

## 2017-09-08 DIAGNOSIS — H40.9 UNSPECIFIED GLAUCOMA: ICD-10-CM

## 2017-09-08 DIAGNOSIS — E87.6 HYPOKALEMIA: ICD-10-CM

## 2017-09-08 DIAGNOSIS — A41.50 GRAM-NEGATIVE SEPSIS, UNSPECIFIED: ICD-10-CM

## 2017-09-08 DIAGNOSIS — I25.10 ATHEROSCLEROTIC HEART DISEASE OF NATIVE CORONARY ARTERY WITHOUT ANGINA PECTORIS: ICD-10-CM

## 2017-09-08 LAB
B PERT DNA SPEC QL NAA+PROBE: SIGNIFICANT CHANGE UP
BUN SERPL-MCNC: 9 MG/DL — SIGNIFICANT CHANGE UP (ref 7–23)
C PNEUM DNA SPEC QL NAA+PROBE: NOT DETECTED — SIGNIFICANT CHANGE UP
CALCIUM SERPL-MCNC: 8.3 MG/DL — LOW (ref 8.4–10.5)
CHLORIDE SERPL-SCNC: 96 MMOL/L — LOW (ref 98–107)
CHOLEST SERPL-MCNC: 74 MG/DL — LOW (ref 120–199)
CO2 SERPL-SCNC: 25 MMOL/L — SIGNIFICANT CHANGE UP (ref 22–31)
CORTIS SERPL-MCNC: 20 UG/DL — HIGH (ref 2.7–18.4)
CREAT SERPL-MCNC: 0.85 MG/DL — SIGNIFICANT CHANGE UP (ref 0.5–1.3)
FLUAV H1 2009 PAND RNA SPEC QL NAA+PROBE: NOT DETECTED — SIGNIFICANT CHANGE UP
FLUAV H1 RNA SPEC QL NAA+PROBE: NOT DETECTED — SIGNIFICANT CHANGE UP
FLUAV H3 RNA SPEC QL NAA+PROBE: NOT DETECTED — SIGNIFICANT CHANGE UP
FLUAV SUBTYP SPEC NAA+PROBE: SIGNIFICANT CHANGE UP
FLUBV RNA SPEC QL NAA+PROBE: NOT DETECTED — SIGNIFICANT CHANGE UP
GLUCOSE SERPL-MCNC: 133 MG/DL — HIGH (ref 70–99)
HADV DNA SPEC QL NAA+PROBE: NOT DETECTED — SIGNIFICANT CHANGE UP
HBA1C BLD-MCNC: 6.9 % — HIGH (ref 4–5.6)
HCOV 229E RNA SPEC QL NAA+PROBE: NOT DETECTED — SIGNIFICANT CHANGE UP
HCOV HKU1 RNA SPEC QL NAA+PROBE: NOT DETECTED — SIGNIFICANT CHANGE UP
HCOV NL63 RNA SPEC QL NAA+PROBE: NOT DETECTED — SIGNIFICANT CHANGE UP
HCOV OC43 RNA SPEC QL NAA+PROBE: NOT DETECTED — SIGNIFICANT CHANGE UP
HCT VFR BLD CALC: 28.8 % — LOW (ref 34.5–45)
HDLC SERPL-MCNC: 6 MG/DL — LOW (ref 45–65)
HGB BLD-MCNC: 10 G/DL — LOW (ref 11.5–15.5)
HMPV RNA SPEC QL NAA+PROBE: NOT DETECTED — SIGNIFICANT CHANGE UP
HPIV1 RNA SPEC QL NAA+PROBE: NOT DETECTED — SIGNIFICANT CHANGE UP
HPIV2 RNA SPEC QL NAA+PROBE: NOT DETECTED — SIGNIFICANT CHANGE UP
HPIV3 RNA SPEC QL NAA+PROBE: NOT DETECTED — SIGNIFICANT CHANGE UP
HPIV4 RNA SPEC QL NAA+PROBE: NOT DETECTED — SIGNIFICANT CHANGE UP
LIPID PNL WITH DIRECT LDL SERPL: 21 MG/DL — SIGNIFICANT CHANGE UP
M PNEUMO DNA SPEC QL NAA+PROBE: NOT DETECTED — SIGNIFICANT CHANGE UP
MAGNESIUM SERPL-MCNC: 1.7 MG/DL — SIGNIFICANT CHANGE UP (ref 1.6–2.6)
MCHC RBC-ENTMCNC: 26.6 PG — LOW (ref 27–34)
MCHC RBC-ENTMCNC: 34.7 % — SIGNIFICANT CHANGE UP (ref 32–36)
MCV RBC AUTO: 76.6 FL — LOW (ref 80–100)
NRBC # FLD: 0 — SIGNIFICANT CHANGE UP
ORGANISM # SPEC MICROSCOPIC CNT: SIGNIFICANT CHANGE UP
OSMOLALITY SERPL: 264 MOSMO/KG — LOW (ref 275–295)
PLATELET # BLD AUTO: 152 K/UL — SIGNIFICANT CHANGE UP (ref 150–400)
PMV BLD: 11.3 FL — SIGNIFICANT CHANGE UP (ref 7–13)
POTASSIUM SERPL-MCNC: 3.3 MMOL/L — LOW (ref 3.5–5.3)
POTASSIUM SERPL-SCNC: 3.3 MMOL/L — LOW (ref 3.5–5.3)
RBC # BLD: 3.76 M/UL — LOW (ref 3.8–5.2)
RBC # FLD: 16 % — HIGH (ref 10.3–14.5)
RSV RNA SPEC QL NAA+PROBE: NOT DETECTED — SIGNIFICANT CHANGE UP
RV+EV RNA SPEC QL NAA+PROBE: NOT DETECTED — SIGNIFICANT CHANGE UP
SODIUM SERPL-SCNC: 131 MMOL/L — LOW (ref 135–145)
SODIUM UR-SCNC: 81 MEQ/L — SIGNIFICANT CHANGE UP
SPECIMEN SOURCE: SIGNIFICANT CHANGE UP
SPECIMEN SOURCE: SIGNIFICANT CHANGE UP
TRIGL SERPL-MCNC: 87 MG/DL — SIGNIFICANT CHANGE UP (ref 10–149)
TSH SERPL-MCNC: 0.59 UIU/ML — SIGNIFICANT CHANGE UP (ref 0.27–4.2)
WBC # BLD: 9.86 K/UL — SIGNIFICANT CHANGE UP (ref 3.8–10.5)
WBC # FLD AUTO: 9.86 K/UL — SIGNIFICANT CHANGE UP (ref 3.8–10.5)

## 2017-09-08 PROCEDURE — 71250 CT THORAX DX C-: CPT | Mod: 26

## 2017-09-08 PROCEDURE — 99233 SBSQ HOSP IP/OBS HIGH 50: CPT

## 2017-09-08 RX ORDER — PIPERACILLIN AND TAZOBACTAM 4; .5 G/20ML; G/20ML
3.38 INJECTION, POWDER, LYOPHILIZED, FOR SOLUTION INTRAVENOUS EVERY 8 HOURS
Qty: 0 | Refills: 0 | Status: DISCONTINUED | OUTPATIENT
Start: 2017-09-08 | End: 2017-09-11

## 2017-09-08 RX ORDER — IPRATROPIUM/ALBUTEROL SULFATE 18-103MCG
3 AEROSOL WITH ADAPTER (GRAM) INHALATION EVERY 6 HOURS
Qty: 0 | Refills: 0 | Status: DISCONTINUED | OUTPATIENT
Start: 2017-09-08 | End: 2017-09-13

## 2017-09-08 RX ORDER — POTASSIUM CHLORIDE 20 MEQ
40 PACKET (EA) ORAL ONCE
Qty: 0 | Refills: 0 | Status: COMPLETED | OUTPATIENT
Start: 2017-09-08 | End: 2017-09-08

## 2017-09-08 RX ORDER — METOPROLOL TARTRATE 50 MG
125 TABLET ORAL
Qty: 0 | Refills: 0 | Status: DISCONTINUED | OUTPATIENT
Start: 2017-09-08 | End: 2017-09-09

## 2017-09-08 RX ORDER — BUDESONIDE, MICRONIZED 100 %
0.5 POWDER (GRAM) MISCELLANEOUS
Qty: 0 | Refills: 0 | Status: DISCONTINUED | OUTPATIENT
Start: 2017-09-08 | End: 2017-09-13

## 2017-09-08 RX ORDER — PIPERACILLIN AND TAZOBACTAM 4; .5 G/20ML; G/20ML
3.38 INJECTION, POWDER, LYOPHILIZED, FOR SOLUTION INTRAVENOUS ONCE
Qty: 0 | Refills: 0 | Status: COMPLETED | OUTPATIENT
Start: 2017-09-08 | End: 2017-09-08

## 2017-09-08 RX ADMIN — Medication 40 MILLIEQUIVALENT(S): at 12:22

## 2017-09-08 RX ADMIN — PIPERACILLIN AND TAZOBACTAM 25 GRAM(S): 4; .5 INJECTION, POWDER, LYOPHILIZED, FOR SOLUTION INTRAVENOUS at 14:45

## 2017-09-08 RX ADMIN — DORZOLAMIDE HYDROCHLORIDE 1 DROP(S): 20 SOLUTION/ DROPS OPHTHALMIC at 17:03

## 2017-09-08 RX ADMIN — LISINOPRIL 40 MILLIGRAM(S): 2.5 TABLET ORAL at 05:30

## 2017-09-08 RX ADMIN — Medication 100 MILLIGRAM(S): at 05:30

## 2017-09-08 RX ADMIN — ATORVASTATIN CALCIUM 80 MILLIGRAM(S): 80 TABLET, FILM COATED ORAL at 21:06

## 2017-09-08 RX ADMIN — LATANOPROST 1 DROP(S): 0.05 SOLUTION/ DROPS OPHTHALMIC; TOPICAL at 21:06

## 2017-09-08 RX ADMIN — Medication 1 DROP(S): at 05:30

## 2017-09-08 RX ADMIN — APIXABAN 5 MILLIGRAM(S): 2.5 TABLET, FILM COATED ORAL at 05:30

## 2017-09-08 RX ADMIN — Medication 3 MILLIGRAM(S): at 21:06

## 2017-09-08 RX ADMIN — Medication 500 MICROGRAM(S): at 10:23

## 2017-09-08 RX ADMIN — Medication 0.5 MILLIGRAM(S): at 21:40

## 2017-09-08 RX ADMIN — APIXABAN 5 MILLIGRAM(S): 2.5 TABLET, FILM COATED ORAL at 17:01

## 2017-09-08 RX ADMIN — Medication 1 DROP(S): at 17:03

## 2017-09-08 RX ADMIN — DORZOLAMIDE HYDROCHLORIDE 1 DROP(S): 20 SOLUTION/ DROPS OPHTHALMIC at 05:30

## 2017-09-08 RX ADMIN — Medication 125 MILLIGRAM(S): at 17:04

## 2017-09-08 RX ADMIN — PIPERACILLIN AND TAZOBACTAM 200 GRAM(S): 4; .5 INJECTION, POWDER, LYOPHILIZED, FOR SOLUTION INTRAVENOUS at 08:35

## 2017-09-08 RX ADMIN — PIPERACILLIN AND TAZOBACTAM 25 GRAM(S): 4; .5 INJECTION, POWDER, LYOPHILIZED, FOR SOLUTION INTRAVENOUS at 21:21

## 2017-09-08 RX ADMIN — Medication 81 MILLIGRAM(S): at 12:22

## 2017-09-08 RX ADMIN — Medication 500 MICROGRAM(S): at 03:50

## 2017-09-08 RX ADMIN — AMLODIPINE BESYLATE 5 MILLIGRAM(S): 2.5 TABLET ORAL at 05:30

## 2017-09-08 RX ADMIN — Medication 1: at 08:34

## 2017-09-08 NOTE — CONSULT NOTE ADULT - SUBJECTIVE AND OBJECTIVE BOX
Pulmonary Consult Note    ANABELL TORRES  MRN-[id number]    Chief Complaint: Patient is a 82y old  Female who presents with a chief complaint of SOB and confusion. Unable to walk (07 Sep 2017 10:36)      HPI:  82yFemale   -reports chest congestion, occasional wheeze relieved with nebulizers.  has been seeing a pulmonologist as outpatient who started her on breo.  No history of asthma/COPD that she knows of.  Increased allergy symptoms this year.  no recent fever/chills.    ROS:  -heart racing, palpitations  All other systems reviewed and negative    PAST MEDICAL HISTORY: HEALTH ISSUES - PROBLEM Dx:  Hematuria: Hematuria  Other proteinuria: Other proteinuria  Hypomagnesemia: Hypomagnesemia  Need for prophylactic measure: Need for prophylactic measure  Glaucoma: Glaucoma  Essential hypertension: Essential hypertension  Type 2 diabetes mellitus without complication, unspecified long term insulin use status: Type 2 diabetes mellitus without complication, unspecified long term insulin use status  CAD (coronary artery disease): CAD (coronary artery disease)  Hyponatremia: Hyponatremia  UTI (urinary tract infection): UTI (urinary tract infection)  Metabolic encephalopathy: Metabolic encephalopathy  Atrial fibrillation with RVR: Atrial fibrillation with RVR    SOCIAL HISTORY: nonsmoker    ACTIVE MEDICATION LIST:  MEDICATIONS  (STANDING):  aspirin enteric coated 81 milliGRAM(s) Oral daily  lisinopril 40 milliGRAM(s) Oral daily  atorvastatin 80 milliGRAM(s) Oral at bedtime  dorzolamide 2% Ophthalmic Solution 1 Drop(s) Both EYES two times a day  pilocarpine 1% Solution 1 Drop(s) Both EYES two times a day  apixaban 5 milliGRAM(s) Oral every 12 hours  latanoprost 0.005% Ophthalmic Solution 1 Drop(s) Both EYES at bedtime  amLODIPine   Tablet 5 milliGRAM(s) Oral daily  insulin lispro (HumaLOG) corrective regimen sliding scale   SubCutaneous three times a day before meals  insulin lispro (HumaLOG) corrective regimen sliding scale   SubCutaneous at bedtime  dextrose 5%. 1000 milliLiter(s) (50 mL/Hr) IV Continuous <Continuous>  dextrose 50% Injectable 12.5 Gram(s) IV Push once  dextrose 50% Injectable 25 Gram(s) IV Push once  dextrose 50% Injectable 25 Gram(s) IV Push once  sodium chloride 0.9%. 1000 milliLiter(s) (75 mL/Hr) IV Continuous <Continuous>  ipratropium    for Nebulization 500 MICROGram(s) Nebulizer every 6 hours  piperacillin/tazobactam IVPB. 3.375 Gram(s) IV Intermittent every 8 hours  buDESOnide   0.5 milliGRAM(s) Respule 0.5 milliGRAM(s) Inhalation two times a day  metoprolol 125 milliGRAM(s) Oral two times a day    MEDICATIONS  (PRN):  dextrose Gel 1 Dose(s) Oral once PRN Blood Glucose LESS THAN 70 milliGRAM(s)/deciliter  glucagon  Injectable 1 milliGRAM(s) IntraMuscular once PRN Glucose LESS THAN 70 milligrams/deciliter  melatonin 3 milliGRAM(s) Oral at bedtime PRN Insomnia      EXAM:  Vital Signs Last 24 Hrs  T(C): 37 (08 Sep 2017 08:43), Max: 38.1 (07 Sep 2017 17:15)  T(F): 98.6 (08 Sep 2017 08:43), Max: 100.6 (07 Sep 2017 17:15)  HR: 107 (08 Sep 2017 10:23) (86 - 136)  BP: 144/88 (08 Sep 2017 08:43) (134/81 - 156/84)  BP(mean): --  RR: 23 (08 Sep 2017 08:43) (18 - 25)  SpO2: 98% (08 Sep 2017 10:23) (98% - 100%)    GENERAL: No acute distress  NEURO: Alert and oriented x 3  LUNGS: Clear to auscultation bilaterally, no rales or wheezes  CV: S1/S2  ABDOMEN: +BS, nontender  EXTREMITIES: no clubbing, cyanosis, edema    LABS/IMAGING: reviewed                        10.0   9.86  )-----------( 152      ( 08 Sep 2017 06:50 )             28.8     09-08    131<L>  |  96<L>  |  9   ----------------------------<  133<H>  3.3<L>   |  25  |  0.85    Ca    8.3<L>      08 Sep 2017 06:50  Mg     1.7     09-08    TPro  7.0  /  Alb  3.0<L>  /  TBili  0.7  /  DBili  x   /  AST  34<H>  /  ALT  29  /  AlkPhos  81  09-07      < from: Xray Chest 1 View AP/PA (09.07.17 @ 07:01) >  IMPRESSION:   Clear lungs.    < end of copied text >    PROBLEM LIST:  82yFemale with HEALTH ISSUES - PROBLEM Dx:  cough/wheeze  Essential hypertension  Type 2 diabetes mellitus   CAD (coronary artery disease)  Hyponatremia  UTI (urinary tract infection)  Atrial fibrillation with RVR    RECS:  -possible viral illness vs. allergies, would check RVP  -wheeze could be secondary to BB which are being used now for rate control, could consider a change at some point if no improvement.  -trial of pumicort nebs bid, duoneb Q6 PRN sob/cough/wheeze  -Cont abx for uti and bacteremia  -afib: rate control, ac, cards fu    Thank you for this consultation, please feel free to call with any questions [phone]  Pinky Preston MD

## 2017-09-08 NOTE — PROGRESS NOTE ADULT - SUBJECTIVE AND OBJECTIVE BOX
INTERVAL HISTORY: pt is lying in bed comfortable, not in distress  	  MEDICATIONS:  aspirin enteric coated 81 milliGRAM(s) Oral daily  lisinopril 40 milliGRAM(s) Oral daily  apixaban 5 milliGRAM(s) Oral every 12 hours  amLODIPine   Tablet 5 milliGRAM(s) Oral daily  metoprolol 125 milliGRAM(s) Oral two times a day    piperacillin/tazobactam IVPB. 3.375 Gram(s) IV Intermittent every 8 hours    ipratropium    for Nebulization 500 MICROGram(s) Nebulizer every 6 hours  buDESOnide   0.5 milliGRAM(s) Respule 0.5 milliGRAM(s) Inhalation two times a day    melatonin 3 milliGRAM(s) Oral at bedtime PRN      atorvastatin 80 milliGRAM(s) Oral at bedtime  insulin lispro (HumaLOG) corrective regimen sliding scale   SubCutaneous three times a day before meals  insulin lispro (HumaLOG) corrective regimen sliding scale   SubCutaneous at bedtime  dextrose Gel 1 Dose(s) Oral once PRN  dextrose 50% Injectable 12.5 Gram(s) IV Push once  dextrose 50% Injectable 25 Gram(s) IV Push once  dextrose 50% Injectable 25 Gram(s) IV Push once  glucagon  Injectable 1 milliGRAM(s) IntraMuscular once PRN    dorzolamide 2% Ophthalmic Solution 1 Drop(s) Both EYES two times a day  pilocarpine 1% Solution 1 Drop(s) Both EYES two times a day  latanoprost 0.005% Ophthalmic Solution 1 Drop(s) Both EYES at bedtime  dextrose 5%. 1000 milliLiter(s) IV Continuous <Continuous>  sodium chloride 0.9%. 1000 milliLiter(s) IV Continuous <Continuous>      PHYSICAL EXAM:  T(C): 37 (09-08-17 @ 08:43), Max: 38.1 (09-07-17 @ 17:15)  HR: 107 (09-08-17 @ 10:23) (86 - 136)  BP: 144/88 (09-08-17 @ 08:43) (134/81 - 156/84)  RR: 23 (09-08-17 @ 08:43) (18 - 25)  SpO2: 98% (09-08-17 @ 10:23) (98% - 100%)  Wt(kg): --  I&O's Summary    07 Sep 2017 07:01  -  08 Sep 2017 07:00  --------------------------------------------------------  IN: 850 mL / OUT: 0 mL / NET: 850 mL          Appearance: Normal	  HEENT:   Normal oral mucosa, PERRL, EOMI	  Cardiovascular: Normal S1 S2, No JVD, No murmurs, No edema, irregularly irregular  Respiratory: Lungs clear to auscultation	  Gastrointestinal:  Soft, Non-tender, + BS	  Extremities: Normal range of motion, No clubbing, cyanosis or edema        CKMB: 1.90 ng/mL (09-07 @ 14:32)                              10.0   9.86  )-----------( 152      ( 08 Sep 2017 06:50 )             28.8     09-08    131<L>  |  96<L>  |  9   ----------------------------<  133<H>  3.3<L>   |  25  |  0.85    Ca    8.3<L>      08 Sep 2017 06:50  Mg     1.7     09-08    TPro  7.0  /  Alb  3.0<L>  /  TBili  0.7  /  DBili  x   /  AST  34<H>  /  ALT  29  /  AlkPhos  81  09-07    proBNP:   Lipid Profile:   HgA1c: Hemoglobin A1C, Whole Blood: 6.9 % (09-08 @ 06:50)    TSH: Thyroid Stimulating Hormone, Serum: 0.59 uIU/mL (09-08 @ 06:50)

## 2017-09-08 NOTE — PROGRESS NOTE ADULT - SUBJECTIVE AND OBJECTIVE BOX
Patient is a 82y old  Female who presents with a chief complaint of SOB and confusion. (07 Sep 2017 10:36)      SUBJECTIVE / OVERNIGHT EVENTS:  Feels a bit better. No current complaints    MEDICATIONS  (STANDING):  aspirin enteric coated 81 milliGRAM(s) Oral daily  lisinopril 40 milliGRAM(s) Oral daily  atorvastatin 80 milliGRAM(s) Oral at bedtime  dorzolamide 2% Ophthalmic Solution 1 Drop(s) Both EYES two times a day  pilocarpine 1% Solution 1 Drop(s) Both EYES two times a day  apixaban 5 milliGRAM(s) Oral every 12 hours  latanoprost 0.005% Ophthalmic Solution 1 Drop(s) Both EYES at bedtime  amLODIPine   Tablet 5 milliGRAM(s) Oral daily  insulin lispro (HumaLOG) corrective regimen sliding scale   SubCutaneous three times a day before meals  insulin lispro (HumaLOG) corrective regimen sliding scale   SubCutaneous at bedtime  dextrose 5%. 1000 milliLiter(s) (50 mL/Hr) IV Continuous <Continuous>  dextrose 50% Injectable 12.5 Gram(s) IV Push once  dextrose 50% Injectable 25 Gram(s) IV Push once  dextrose 50% Injectable 25 Gram(s) IV Push once  sodium chloride 0.9%. 1000 milliLiter(s) (75 mL/Hr) IV Continuous <Continuous>  ipratropium    for Nebulization 500 MICROGram(s) Nebulizer every 6 hours  piperacillin/tazobactam IVPB. 3.375 Gram(s) IV Intermittent every 8 hours  potassium chloride   Powder 40 milliEquivalent(s) Oral once  buDESOnide   0.5 milliGRAM(s) Respule 0.5 milliGRAM(s) Inhalation two times a day  metoprolol 125 milliGRAM(s) Oral two times a day    MEDICATIONS  (PRN):  dextrose Gel 1 Dose(s) Oral once PRN Blood Glucose LESS THAN 70 milliGRAM(s)/deciliter  glucagon  Injectable 1 milliGRAM(s) IntraMuscular once PRN Glucose LESS THAN 70 milligrams/deciliter  melatonin 3 milliGRAM(s) Oral at bedtime PRN Insomnia      Vital Signs Last 24 Hrs  T(C): 37 (08 Sep 2017 08:43), Max: 38.1 (07 Sep 2017 17:15)  T(F): 98.6 (08 Sep 2017 08:43), Max: 100.6 (07 Sep 2017 17:15)  HR: 107 (08 Sep 2017 10:23) (86 - 136)  BP: 144/88 (08 Sep 2017 08:43) (134/81 - 156/84)  BP(mean): --  RR: 23 (08 Sep 2017 08:43) (18 - 25)  SpO2: 98% (08 Sep 2017 10:23) (98% - 100%)  CAPILLARY BLOOD GLUCOSE  143 (08 Sep 2017 11:52)  161 (08 Sep 2017 07:46)  178 (07 Sep 2017 21:14)  149 (07 Sep 2017 17:44)          PHYSICAL EXAM:  GENERAL: NAD, well-developed  EYES: EOMI, PERRLA, conjunctiva and sclera clear  NECK: Supple, No JVD. No hepatojugular reflex  CHEST/LUNG: Clear to auscultation bilaterally; No wheeze.  No rales  HEART: irregularly irregular rhythm; Mildly tachycardic. No murmurs, rubs, or gallops  ABDOMEN: Soft, Nontender, Nondistended; Bowel sounds present  EXTREMITIES:  2+ Peripheral Pulses, No clubbing, cyanosis. Trace pedal edema b/l  PSYCH: calm and pleasant  NEUROLOGY: non-focal  SKIN: No rashes or lesions    LABS:                        10.0   9.86  )-----------( 152      ( 08 Sep 2017 06:50 )             28.8     09-    131<L>  |  96<L>  |  9   ----------------------------<  133<H>  3.3<L>   |  25  |  0.85    Ca    8.3<L>      08 Sep 2017 06:50  Mg     1.7     -08    TPro  7.0  /  Alb  3.0<L>  /  TBili  0.7  /  DBili  x   /  AST  34<H>  /  ALT  29  /  AlkPhos  81  09-07      CARDIAC MARKERS ( 07 Sep 2017 14:32 )  x     / < 0.06 ng/mL / 68 u/L / 1.90 ng/mL / x      CARDIAC MARKERS ( 07 Sep 2017 04:00 )  x     / < 0.06 ng/mL / 106 u/L / 1.65 ng/mL / x          Urinalysis Basic - ( 07 Sep 2017 04:20 )    Color: YELLOW / Appearance: CLEAR / S.009 / pH: 7.0  Gluc: NEGATIVE / Ketone: TRACE  / Bili: NEGATIVE / Urobili: NORMAL E.U.   Blood: MODERATE / Protein: 100 / Nitrite: NEGATIVE   Leuk Esterase: LARGE / RBC: 5-10 / WBC 10-25   Sq Epi: OCC / Non Sq Epi: x / Bacteria: FEW

## 2017-09-08 NOTE — PROGRESS NOTE ADULT - ASSESSMENT
EKG - Afib RVR LVH  Echo 2016 - mild global LV dysfunction   Cath 2016 - Normal LM, LAD LCX RCA, D1 ostial 90%, treated medically     1) Afib RVR - continue eliquis, heart rate under better control , increase lopressor to 125mg q12, repeat 2D echo    2_ UTI/bacteremia  - gram negative bacteremia on zosyn    3) Wheezing - pulm consult appreciated f/u RVP, cont atrovent

## 2017-09-08 NOTE — PROGRESS NOTE ADULT - SUBJECTIVE AND OBJECTIVE BOX
Dr. Ramirez  Office [phone]  Cell [phone]  [place] [facility]  Cell [phone]      Patient is a 82y old  Female who presents with a chief complaint of SOB and confusion. Unable to walk (07 Sep 2017 10:36)      Patient seen and examined at bedside. No chest pain/sob    VITALS:  T(F): 98.8 (09-08-17 @ 20:51), Max: 98.8 (09-08-17 @ 20:51)  HR: 96 (09-08-17 @ 20:51)  BP: 132/87 (09-08-17 @ 20:51)  RR: 18 (09-08-17 @ 20:51)  SpO2: 96% (09-08-17 @ 20:51)  Wt(kg): --    09-07 @ 07:01  -  09-08 @ 07:00  --------------------------------------------------------  IN: 850 mL / OUT: 0 mL / NET: 850 mL          PHYSICAL EXAM:  Constitutional: NAD  Neck: No JVD  Respiratory: CTAB, no wheezes, rales or rhonchi  Cardiovascular: S1, S2, RRR  Gastrointestinal: BS+, soft, NT/ND  Extremities: No peripheral edema    Hospital Medications:   MEDICATIONS  (STANDING):  aspirin enteric coated 81 milliGRAM(s) Oral daily  lisinopril 40 milliGRAM(s) Oral daily  atorvastatin 80 milliGRAM(s) Oral at bedtime  dorzolamide 2% Ophthalmic Solution 1 Drop(s) Both EYES two times a day  pilocarpine 1% Solution 1 Drop(s) Both EYES two times a day  apixaban 5 milliGRAM(s) Oral every 12 hours  latanoprost 0.005% Ophthalmic Solution 1 Drop(s) Both EYES at bedtime  amLODIPine   Tablet 5 milliGRAM(s) Oral daily  insulin lispro (HumaLOG) corrective regimen sliding scale   SubCutaneous three times a day before meals  insulin lispro (HumaLOG) corrective regimen sliding scale   SubCutaneous at bedtime  dextrose 5%. 1000 milliLiter(s) (50 mL/Hr) IV Continuous <Continuous>  dextrose 50% Injectable 12.5 Gram(s) IV Push once  dextrose 50% Injectable 25 Gram(s) IV Push once  dextrose 50% Injectable 25 Gram(s) IV Push once  sodium chloride 0.9%. 1000 milliLiter(s) (75 mL/Hr) IV Continuous <Continuous>  piperacillin/tazobactam IVPB. 3.375 Gram(s) IV Intermittent every 8 hours  buDESOnide   0.5 milliGRAM(s) Respule 0.5 milliGRAM(s) Inhalation two times a day  metoprolol 125 milliGRAM(s) Oral two times a day      LABS:  09-08    131<L>  |  96<L>  |  9   ----------------------------<  133<H>  3.3<L>   |  25  |  0.85    Ca    8.3<L>      08 Sep 2017 06:50  Mg     1.7     09-08    TPro  7.0  /  Alb  3.0<L>  /  TBili  0.7  /  DBili      /  AST  34<H>  /  ALT  29  /  AlkPhos  81  09-07    Creatinine Trend: 0.85 <--, 0.92 <--, 0.69 <--, 1.08 <--                                10.0   9.86  )-----------( 152      ( 08 Sep 2017 06:50 )             28.8     Urine Studies:  Urinalysis - [09-07-17 @ 04:20]      Color YELLOW / Appearance CLEAR / SG 1.009 / pH 7.0      Gluc NEGATIVE / Ketone TRACE  / Bili NEGATIVE / Urobili NORMAL       Blood MODERATE / Protein 100 / Leuk Est LARGE / Nitrite NEGATIVE      RBC 5-10 / WBC 10-25 / Hyaline  / Gran  / Sq Epi OCC / Non Sq Epi  / Bacteria FEW    Urine Sodium 81      [09-07-17 @ 23:47]    HbA1c 6.9      [09-08-17 @ 06:50]  TSH 0.59      [09-08-17 @ 06:50]  Lipid: chol 74, TG 87, HDL 6, LDL 21      [09-08-17 @ 06:50]        RADIOLOGY & ADDITIONAL STUDIES:

## 2017-09-08 NOTE — PROGRESS NOTE ADULT - ASSESSMENT
Patient is an 82yoF with h/o CAD with ischemic cardiomyopathy (EF 52%), DM, Afib, HTN, HLD, DM with glaucoma presents with exertional dyspnea, found to have GNR bacteremia secondary to GNR UTI, Afib with RVR and hyponatremia.

## 2017-09-09 LAB
-  AMIKACIN: SIGNIFICANT CHANGE UP
-  AMPICILLIN/SULBACTAM: SIGNIFICANT CHANGE UP
-  AMPICILLIN: SIGNIFICANT CHANGE UP
-  AZTREONAM: SIGNIFICANT CHANGE UP
-  CEFAZOLIN: SIGNIFICANT CHANGE UP
-  CEFEPIME: SIGNIFICANT CHANGE UP
-  CEFOXITIN: SIGNIFICANT CHANGE UP
-  CEFTAZIDIME: SIGNIFICANT CHANGE UP
-  CEFTRIAXONE: SIGNIFICANT CHANGE UP
-  CIPROFLOXACIN: SIGNIFICANT CHANGE UP
-  ERTAPENEM: SIGNIFICANT CHANGE UP
-  GENTAMICIN: SIGNIFICANT CHANGE UP
-  LEVOFLOXACIN: SIGNIFICANT CHANGE UP
-  MEROPENEM: SIGNIFICANT CHANGE UP
-  NITROFURANTOIN: SIGNIFICANT CHANGE UP
-  PIPERACILLIN/TAZOBACTAM: SIGNIFICANT CHANGE UP
-  TOBRAMYCIN: SIGNIFICANT CHANGE UP
-  TRIMETHOPRIM/SULFAMETHOXAZOLE: SIGNIFICANT CHANGE UP
BACTERIA UR CULT: SIGNIFICANT CHANGE UP
BUN SERPL-MCNC: 12 MG/DL — SIGNIFICANT CHANGE UP (ref 7–23)
BUN SERPL-MCNC: 12 MG/DL — SIGNIFICANT CHANGE UP (ref 7–23)
BUN SERPL-MCNC: 13 MG/DL — SIGNIFICANT CHANGE UP (ref 7–23)
CALCIUM SERPL-MCNC: 8.2 MG/DL — LOW (ref 8.4–10.5)
CALCIUM SERPL-MCNC: 8.7 MG/DL — SIGNIFICANT CHANGE UP (ref 8.4–10.5)
CALCIUM SERPL-MCNC: 8.9 MG/DL — SIGNIFICANT CHANGE UP (ref 8.4–10.5)
CHLORIDE SERPL-SCNC: 92 MMOL/L — LOW (ref 98–107)
CHLORIDE SERPL-SCNC: 96 MMOL/L — LOW (ref 98–107)
CHLORIDE SERPL-SCNC: 98 MMOL/L — SIGNIFICANT CHANGE UP (ref 98–107)
CO2 SERPL-SCNC: 22 MMOL/L — SIGNIFICANT CHANGE UP (ref 22–31)
CO2 SERPL-SCNC: 23 MMOL/L — SIGNIFICANT CHANGE UP (ref 22–31)
CO2 SERPL-SCNC: 23 MMOL/L — SIGNIFICANT CHANGE UP (ref 22–31)
CREAT SERPL-MCNC: 0.92 MG/DL — SIGNIFICANT CHANGE UP (ref 0.5–1.3)
CREAT SERPL-MCNC: 1 MG/DL — SIGNIFICANT CHANGE UP (ref 0.5–1.3)
CREAT SERPL-MCNC: 1.02 MG/DL — SIGNIFICANT CHANGE UP (ref 0.5–1.3)
GLUCOSE SERPL-MCNC: 122 MG/DL — HIGH (ref 70–99)
GLUCOSE SERPL-MCNC: 137 MG/DL — HIGH (ref 70–99)
GLUCOSE SERPL-MCNC: 165 MG/DL — HIGH (ref 70–99)
HCT VFR BLD CALC: 28.3 % — LOW (ref 34.5–45)
HGB BLD-MCNC: 9.8 G/DL — LOW (ref 11.5–15.5)
MAGNESIUM SERPL-MCNC: 1.6 MG/DL — SIGNIFICANT CHANGE UP (ref 1.6–2.6)
MAGNESIUM SERPL-MCNC: 1.9 MG/DL — SIGNIFICANT CHANGE UP (ref 1.6–2.6)
MAGNESIUM SERPL-MCNC: 1.9 MG/DL — SIGNIFICANT CHANGE UP (ref 1.6–2.6)
MCHC RBC-ENTMCNC: 25.7 PG — LOW (ref 27–34)
MCHC RBC-ENTMCNC: 34.6 % — SIGNIFICANT CHANGE UP (ref 32–36)
MCV RBC AUTO: 74.3 FL — LOW (ref 80–100)
METHOD TYPE: SIGNIFICANT CHANGE UP
NRBC # FLD: 0 — SIGNIFICANT CHANGE UP
ORGANISM # SPEC MICROSCOPIC CNT: SIGNIFICANT CHANGE UP
PLATELET # BLD AUTO: 178 K/UL — SIGNIFICANT CHANGE UP (ref 150–400)
PMV BLD: 11.3 FL — SIGNIFICANT CHANGE UP (ref 7–13)
POTASSIUM SERPL-MCNC: 3.6 MMOL/L — SIGNIFICANT CHANGE UP (ref 3.5–5.3)
POTASSIUM SERPL-MCNC: 3.7 MMOL/L — SIGNIFICANT CHANGE UP (ref 3.5–5.3)
POTASSIUM SERPL-MCNC: 4 MMOL/L — SIGNIFICANT CHANGE UP (ref 3.5–5.3)
POTASSIUM SERPL-SCNC: 3.6 MMOL/L — SIGNIFICANT CHANGE UP (ref 3.5–5.3)
POTASSIUM SERPL-SCNC: 3.7 MMOL/L — SIGNIFICANT CHANGE UP (ref 3.5–5.3)
POTASSIUM SERPL-SCNC: 4 MMOL/L — SIGNIFICANT CHANGE UP (ref 3.5–5.3)
RBC # BLD: 3.81 M/UL — SIGNIFICANT CHANGE UP (ref 3.8–5.2)
RBC # FLD: 16.2 % — HIGH (ref 10.3–14.5)
SODIUM SERPL-SCNC: 129 MMOL/L — LOW (ref 135–145)
SODIUM SERPL-SCNC: 133 MMOL/L — LOW (ref 135–145)
SODIUM SERPL-SCNC: 138 MMOL/L — SIGNIFICANT CHANGE UP (ref 135–145)
SPECIMEN SOURCE: SIGNIFICANT CHANGE UP
SPECIMEN SOURCE: SIGNIFICANT CHANGE UP
WBC # BLD: 10.05 K/UL — SIGNIFICANT CHANGE UP (ref 3.8–10.5)
WBC # FLD AUTO: 10.05 K/UL — SIGNIFICANT CHANGE UP (ref 3.8–10.5)

## 2017-09-09 PROCEDURE — 99233 SBSQ HOSP IP/OBS HIGH 50: CPT

## 2017-09-09 RX ORDER — ACETAMINOPHEN 500 MG
650 TABLET ORAL ONCE
Qty: 0 | Refills: 0 | Status: COMPLETED | OUTPATIENT
Start: 2017-09-09 | End: 2017-09-09

## 2017-09-09 RX ORDER — METOPROLOL TARTRATE 50 MG
150 TABLET ORAL EVERY 12 HOURS
Qty: 0 | Refills: 0 | Status: DISCONTINUED | OUTPATIENT
Start: 2017-09-09 | End: 2017-09-13

## 2017-09-09 RX ADMIN — Medication 1 DROP(S): at 05:20

## 2017-09-09 RX ADMIN — Medication 650 MILLIGRAM(S): at 20:47

## 2017-09-09 RX ADMIN — APIXABAN 5 MILLIGRAM(S): 2.5 TABLET, FILM COATED ORAL at 05:20

## 2017-09-09 RX ADMIN — APIXABAN 5 MILLIGRAM(S): 2.5 TABLET, FILM COATED ORAL at 18:03

## 2017-09-09 RX ADMIN — Medication: at 07:30

## 2017-09-09 RX ADMIN — Medication 0.5 MILLIGRAM(S): at 21:43

## 2017-09-09 RX ADMIN — Medication 150 MILLIGRAM(S): at 18:06

## 2017-09-09 RX ADMIN — Medication 1 DROP(S): at 18:05

## 2017-09-09 RX ADMIN — LISINOPRIL 40 MILLIGRAM(S): 2.5 TABLET ORAL at 05:20

## 2017-09-09 RX ADMIN — Medication 81 MILLIGRAM(S): at 11:53

## 2017-09-09 RX ADMIN — PIPERACILLIN AND TAZOBACTAM 25 GRAM(S): 4; .5 INJECTION, POWDER, LYOPHILIZED, FOR SOLUTION INTRAVENOUS at 05:20

## 2017-09-09 RX ADMIN — Medication 3 MILLIGRAM(S): at 21:40

## 2017-09-09 RX ADMIN — LATANOPROST 1 DROP(S): 0.05 SOLUTION/ DROPS OPHTHALMIC; TOPICAL at 21:31

## 2017-09-09 RX ADMIN — AMLODIPINE BESYLATE 5 MILLIGRAM(S): 2.5 TABLET ORAL at 05:20

## 2017-09-09 RX ADMIN — Medication 0.5 MILLIGRAM(S): at 10:50

## 2017-09-09 RX ADMIN — ATORVASTATIN CALCIUM 80 MILLIGRAM(S): 80 TABLET, FILM COATED ORAL at 21:31

## 2017-09-09 RX ADMIN — Medication 125 MILLIGRAM(S): at 05:20

## 2017-09-09 RX ADMIN — Medication 200 MILLIGRAM(S): at 18:07

## 2017-09-09 RX ADMIN — PIPERACILLIN AND TAZOBACTAM 25 GRAM(S): 4; .5 INJECTION, POWDER, LYOPHILIZED, FOR SOLUTION INTRAVENOUS at 21:31

## 2017-09-09 RX ADMIN — PIPERACILLIN AND TAZOBACTAM 25 GRAM(S): 4; .5 INJECTION, POWDER, LYOPHILIZED, FOR SOLUTION INTRAVENOUS at 14:10

## 2017-09-09 RX ADMIN — DORZOLAMIDE HYDROCHLORIDE 1 DROP(S): 20 SOLUTION/ DROPS OPHTHALMIC at 18:06

## 2017-09-09 RX ADMIN — DORZOLAMIDE HYDROCHLORIDE 1 DROP(S): 20 SOLUTION/ DROPS OPHTHALMIC at 05:20

## 2017-09-09 NOTE — PROGRESS NOTE ADULT - ASSESSMENT
EKG - Afib RVR LVH  Echo 2016 - mild global LV dysfunction   Cath 2016 - Normal LM, LAD LCX RCA, D1 ostial 90%, treated medically     1) Afib RVR - continue eliquis, heart rate under better control but still elevated, increase lopressor to 150mg q12, repeat 2D echo    2)  UTI/bacteremia  - proteus bacteremia on zosyn    3) Wheezing - pulm consult appreciated CT chest shows no pneumonia, small airway disease, robitussin for cough

## 2017-09-09 NOTE — PROGRESS NOTE ADULT - SUBJECTIVE AND OBJECTIVE BOX
INTERVAL HISTORY: pt seen in am comfortable, not in distress, no cp no SOB  	  MEDICATIONS:  aspirin enteric coated 81 milliGRAM(s) Oral daily  lisinopril 40 milliGRAM(s) Oral daily  apixaban 5 milliGRAM(s) Oral every 12 hours  amLODIPine   Tablet 5 milliGRAM(s) Oral daily  metoprolol 125 milliGRAM(s) Oral two times a day    piperacillin/tazobactam IVPB. 3.375 Gram(s) IV Intermittent every 8 hours    buDESOnide   0.5 milliGRAM(s) Respule 0.5 milliGRAM(s) Inhalation two times a day  ALBUTerol/ipratropium for Nebulization 3 milliLiter(s) Nebulizer every 6 hours PRN    melatonin 3 milliGRAM(s) Oral at bedtime PRN      atorvastatin 80 milliGRAM(s) Oral at bedtime  insulin lispro (HumaLOG) corrective regimen sliding scale   SubCutaneous three times a day before meals  insulin lispro (HumaLOG) corrective regimen sliding scale   SubCutaneous at bedtime  dextrose Gel 1 Dose(s) Oral once PRN  dextrose 50% Injectable 12.5 Gram(s) IV Push once  dextrose 50% Injectable 25 Gram(s) IV Push once  dextrose 50% Injectable 25 Gram(s) IV Push once  glucagon  Injectable 1 milliGRAM(s) IntraMuscular once PRN    dorzolamide 2% Ophthalmic Solution 1 Drop(s) Both EYES two times a day  pilocarpine 1% Solution 1 Drop(s) Both EYES two times a day  latanoprost 0.005% Ophthalmic Solution 1 Drop(s) Both EYES at bedtime  dextrose 5%. 1000 milliLiter(s) IV Continuous <Continuous>  sodium chloride 0.9%. 1000 milliLiter(s) IV Continuous <Continuous>      PHYSICAL EXAM:  T(C): 37.1 (09-08-17 @ 20:51), Max: 37.1 (09-08-17 @ 20:51)  HR: 101 (09-08-17 @ 21:41) (96 - 106)  BP: 132/87 (09-08-17 @ 20:51) (132/87 - 148/86)  RR: 18 (09-08-17 @ 20:51) (18 - 18)  SpO2: 98% (09-08-17 @ 21:41) (96% - 98%)  Wt(kg): --  I&O's Summary        Appearance: Normal	  HEENT:   Normal oral mucosa, PERRL, EOMI	  Cardiovascular: Normal S1 S2, No JVD, No murmurs, No edema  Respiratory: Lungs clear to auscultation	  Gastrointestinal:  Soft, Non-tender, + BS	  Extremities: Normal range of motion, No clubbing, cyanosis or edema                                    9.8    10.05 )-----------( 178      ( 09 Sep 2017 06:00 )             28.3     09-09    133<L>  |  96<L>  |  13  ----------------------------<  137<H>  4.0   |  22  |  1.02    Ca    8.7      09 Sep 2017 06:00  Mg     1.9     09-09      proBNP:   Lipid Profile:   HgA1c:   TSH:

## 2017-09-09 NOTE — PROGRESS NOTE ADULT - SUBJECTIVE AND OBJECTIVE BOX
Patient is a 82y old  Female who presents with a chief complaint of SOB and confusion. Unable to walk (07 Sep 2017 10:36)      SUBJECTIVE / OVERNIGHT EVENTS: Pt seen and examined. Only c/o dry cough. No other complaints.    MEDICATIONS  (STANDING):  aspirin enteric coated 81 milliGRAM(s) Oral daily  lisinopril 40 milliGRAM(s) Oral daily  atorvastatin 80 milliGRAM(s) Oral at bedtime  dorzolamide 2% Ophthalmic Solution 1 Drop(s) Both EYES two times a day  pilocarpine 1% Solution 1 Drop(s) Both EYES two times a day  apixaban 5 milliGRAM(s) Oral every 12 hours  latanoprost 0.005% Ophthalmic Solution 1 Drop(s) Both EYES at bedtime  amLODIPine   Tablet 5 milliGRAM(s) Oral daily  insulin lispro (HumaLOG) corrective regimen sliding scale   SubCutaneous three times a day before meals  insulin lispro (HumaLOG) corrective regimen sliding scale   SubCutaneous at bedtime  dextrose 5%. 1000 milliLiter(s) (50 mL/Hr) IV Continuous <Continuous>  dextrose 50% Injectable 12.5 Gram(s) IV Push once  dextrose 50% Injectable 25 Gram(s) IV Push once  dextrose 50% Injectable 25 Gram(s) IV Push once  sodium chloride 0.9%. 1000 milliLiter(s) (75 mL/Hr) IV Continuous <Continuous>  piperacillin/tazobactam IVPB. 3.375 Gram(s) IV Intermittent every 8 hours  buDESOnide   0.5 milliGRAM(s) Respule 0.5 milliGRAM(s) Inhalation two times a day  metoprolol 125 milliGRAM(s) Oral two times a day    MEDICATIONS  (PRN):  dextrose Gel 1 Dose(s) Oral once PRN Blood Glucose LESS THAN 70 milliGRAM(s)/deciliter  glucagon  Injectable 1 milliGRAM(s) IntraMuscular once PRN Glucose LESS THAN 70 milligrams/deciliter  melatonin 3 milliGRAM(s) Oral at bedtime PRN Insomnia  ALBUTerol/ipratropium for Nebulization 3 milliLiter(s) Nebulizer every 6 hours PRN Shortness of Breath and/or Wheezing      Vital Signs Last 24 Hrs  T(C): 37.1 (08 Sep 2017 20:51), Max: 37.1 (08 Sep 2017 20:51)  T(F): 98.8 (08 Sep 2017 20:51), Max: 98.8 (08 Sep 2017 20:51)  HR: 101 (08 Sep 2017 21:41) (96 - 106)  BP: 132/87 (08 Sep 2017 20:51) (132/87 - 148/86)  BP(mean): --  RR: 18 (08 Sep 2017 20:51) (18 - 18)  SpO2: 98% (08 Sep 2017 21:41) (96% - 98%)  CAPILLARY BLOOD GLUCOSE  161 (08 Sep 2017 21:26)  145 (08 Sep 2017 16:06)  143 (08 Sep 2017 11:52)        I&O's Summary      PHYSICAL EXAM:  GENERAL: NAD, well-developed  HEAD:  Atraumatic, Normocephalic  EYES: EOMI, PERRLA, conjunctiva and sclera clear  NECK: Supple, No JVD  CHEST/LUNG: Clear to auscultation bilaterally; No wheeze  HEART: Regular rate and rhythm; No murmurs, rubs, or gallops  ABDOMEN: Soft, Nontender, Nondistended; Bowel sounds present  EXTREMITIES:  2+ Peripheral Pulses, No clubbing, cyanosis, or edema  PSYCH: AAOx3  NEUROLOGY: non-focal  SKIN: No rashes or lesions    LABS:                        9.8    10.05 )-----------( 178      ( 09 Sep 2017 06:00 )             28.3     09-09    133<L>  |  96<L>  |  13  ----------------------------<  137<H>  4.0   |  22  |  1.02    Ca    8.7      09 Sep 2017 06:00  Mg     1.9     09-09        CARDIAC MARKERS ( 07 Sep 2017 14:32 )  x     / < 0.06 ng/mL / 68 u/L / 1.90 ng/mL / x              RADIOLOGY & ADDITIONAL TESTS:    Imaging Personally Reviewed:    Consultant(s) Notes Reviewed:      Care Discussed with Consultants/Other Providers:

## 2017-09-09 NOTE — PROGRESS NOTE ADULT - PROBLEM SELECTOR PLAN 4
Check Urine OSM  TSH, AM Cortisol WNL  Patient was hypovolemic initially - suspect hypovolemic hypotonic hyponatremia 2/2 insensible losses from sepsis +/- component of SIADH

## 2017-09-10 LAB
-  AMIKACIN: SIGNIFICANT CHANGE UP
-  AMPICILLIN/SULBACTAM: SIGNIFICANT CHANGE UP
-  AMPICILLIN: SIGNIFICANT CHANGE UP
-  AZTREONAM: SIGNIFICANT CHANGE UP
-  CEFAZOLIN: SIGNIFICANT CHANGE UP
-  CEFEPIME: SIGNIFICANT CHANGE UP
-  CEFOXITIN: SIGNIFICANT CHANGE UP
-  CEFTAZIDIME: SIGNIFICANT CHANGE UP
-  CEFTRIAXONE: SIGNIFICANT CHANGE UP
-  CIPROFLOXACIN: SIGNIFICANT CHANGE UP
-  ERTAPENEM: SIGNIFICANT CHANGE UP
-  GENTAMICIN: SIGNIFICANT CHANGE UP
-  LEVOFLOXACIN: SIGNIFICANT CHANGE UP
-  MEROPENEM: SIGNIFICANT CHANGE UP
-  PIPERACILLIN/TAZOBACTAM: SIGNIFICANT CHANGE UP
-  TOBRAMYCIN: SIGNIFICANT CHANGE UP
-  TRIMETHOPRIM/SULFAMETHOXAZOLE: SIGNIFICANT CHANGE UP
BUN SERPL-MCNC: 13 MG/DL — SIGNIFICANT CHANGE UP (ref 7–23)
CALCIUM SERPL-MCNC: 9 MG/DL — SIGNIFICANT CHANGE UP (ref 8.4–10.5)
CHLORIDE SERPL-SCNC: 95 MMOL/L — LOW (ref 98–107)
CO2 SERPL-SCNC: 24 MMOL/L — SIGNIFICANT CHANGE UP (ref 22–31)
CREAT SERPL-MCNC: 1.02 MG/DL — SIGNIFICANT CHANGE UP (ref 0.5–1.3)
GLUCOSE SERPL-MCNC: 134 MG/DL — HIGH (ref 70–99)
HCT VFR BLD CALC: 30.6 % — LOW (ref 34.5–45)
HGB BLD-MCNC: 10.3 G/DL — LOW (ref 11.5–15.5)
MAGNESIUM SERPL-MCNC: 2 MG/DL — SIGNIFICANT CHANGE UP (ref 1.6–2.6)
MCHC RBC-ENTMCNC: 25.4 PG — LOW (ref 27–34)
MCHC RBC-ENTMCNC: 33.7 % — SIGNIFICANT CHANGE UP (ref 32–36)
MCV RBC AUTO: 75.4 FL — LOW (ref 80–100)
METHOD TYPE: SIGNIFICANT CHANGE UP
NRBC # FLD: 0 — SIGNIFICANT CHANGE UP
NT-PROBNP SERPL-SCNC: 5528 PG/ML — SIGNIFICANT CHANGE UP
ORGANISM # SPEC MICROSCOPIC CNT: SIGNIFICANT CHANGE UP
ORGANISM # SPEC MICROSCOPIC CNT: SIGNIFICANT CHANGE UP
OSMOLALITY UR: 266 MOSMO/KG — SIGNIFICANT CHANGE UP (ref 50–1200)
PLATELET # BLD AUTO: 205 K/UL — SIGNIFICANT CHANGE UP (ref 150–400)
PMV BLD: 10.8 FL — SIGNIFICANT CHANGE UP (ref 7–13)
POTASSIUM SERPL-MCNC: 3.6 MMOL/L — SIGNIFICANT CHANGE UP (ref 3.5–5.3)
POTASSIUM SERPL-SCNC: 3.6 MMOL/L — SIGNIFICANT CHANGE UP (ref 3.5–5.3)
RBC # BLD: 4.06 M/UL — SIGNIFICANT CHANGE UP (ref 3.8–5.2)
RBC # FLD: 16.7 % — HIGH (ref 10.3–14.5)
SODIUM SERPL-SCNC: 130 MMOL/L — LOW (ref 135–145)
URATE SERPL-MCNC: 2 MG/DL — LOW (ref 2.5–7)
WBC # BLD: 8.96 K/UL — SIGNIFICANT CHANGE UP (ref 3.8–10.5)
WBC # FLD AUTO: 8.96 K/UL — SIGNIFICANT CHANGE UP (ref 3.8–10.5)

## 2017-09-10 PROCEDURE — 99233 SBSQ HOSP IP/OBS HIGH 50: CPT

## 2017-09-10 PROCEDURE — 71010: CPT | Mod: 26

## 2017-09-10 RX ORDER — FUROSEMIDE 40 MG
20 TABLET ORAL DAILY
Qty: 0 | Refills: 0 | Status: DISCONTINUED | OUTPATIENT
Start: 2017-09-11 | End: 2017-09-12

## 2017-09-10 RX ORDER — FUROSEMIDE 40 MG
40 TABLET ORAL ONCE
Qty: 0 | Refills: 0 | Status: COMPLETED | OUTPATIENT
Start: 2017-09-10 | End: 2017-09-10

## 2017-09-10 RX ORDER — MONTELUKAST 4 MG/1
10 TABLET, CHEWABLE ORAL AT BEDTIME
Qty: 0 | Refills: 0 | Status: DISCONTINUED | OUTPATIENT
Start: 2017-09-10 | End: 2017-09-13

## 2017-09-10 RX ADMIN — APIXABAN 5 MILLIGRAM(S): 2.5 TABLET, FILM COATED ORAL at 17:40

## 2017-09-10 RX ADMIN — MONTELUKAST 10 MILLIGRAM(S): 4 TABLET, CHEWABLE ORAL at 21:12

## 2017-09-10 RX ADMIN — Medication 200 MILLIGRAM(S): at 05:19

## 2017-09-10 RX ADMIN — Medication 1: at 08:18

## 2017-09-10 RX ADMIN — Medication 81 MILLIGRAM(S): at 13:07

## 2017-09-10 RX ADMIN — Medication 40 MILLIGRAM(S): at 10:23

## 2017-09-10 RX ADMIN — Medication 150 MILLIGRAM(S): at 05:09

## 2017-09-10 RX ADMIN — Medication 0.5 MILLIGRAM(S): at 08:56

## 2017-09-10 RX ADMIN — DORZOLAMIDE HYDROCHLORIDE 1 DROP(S): 20 SOLUTION/ DROPS OPHTHALMIC at 05:09

## 2017-09-10 RX ADMIN — DORZOLAMIDE HYDROCHLORIDE 1 DROP(S): 20 SOLUTION/ DROPS OPHTHALMIC at 17:14

## 2017-09-10 RX ADMIN — Medication 1: at 13:07

## 2017-09-10 RX ADMIN — PIPERACILLIN AND TAZOBACTAM 25 GRAM(S): 4; .5 INJECTION, POWDER, LYOPHILIZED, FOR SOLUTION INTRAVENOUS at 13:07

## 2017-09-10 RX ADMIN — Medication 1 DROP(S): at 17:14

## 2017-09-10 RX ADMIN — PIPERACILLIN AND TAZOBACTAM 25 GRAM(S): 4; .5 INJECTION, POWDER, LYOPHILIZED, FOR SOLUTION INTRAVENOUS at 05:09

## 2017-09-10 RX ADMIN — LISINOPRIL 40 MILLIGRAM(S): 2.5 TABLET ORAL at 05:09

## 2017-09-10 RX ADMIN — APIXABAN 5 MILLIGRAM(S): 2.5 TABLET, FILM COATED ORAL at 05:09

## 2017-09-10 RX ADMIN — ATORVASTATIN CALCIUM 80 MILLIGRAM(S): 80 TABLET, FILM COATED ORAL at 21:12

## 2017-09-10 RX ADMIN — Medication 200 MILLIGRAM(S): at 13:09

## 2017-09-10 RX ADMIN — Medication 1 DROP(S): at 05:09

## 2017-09-10 RX ADMIN — Medication 0.5 MILLIGRAM(S): at 21:12

## 2017-09-10 RX ADMIN — Medication 3 MILLILITER(S): at 09:02

## 2017-09-10 RX ADMIN — PIPERACILLIN AND TAZOBACTAM 25 GRAM(S): 4; .5 INJECTION, POWDER, LYOPHILIZED, FOR SOLUTION INTRAVENOUS at 21:12

## 2017-09-10 RX ADMIN — AMLODIPINE BESYLATE 5 MILLIGRAM(S): 2.5 TABLET ORAL at 05:09

## 2017-09-10 RX ADMIN — Medication 150 MILLIGRAM(S): at 17:40

## 2017-09-10 RX ADMIN — LATANOPROST 1 DROP(S): 0.05 SOLUTION/ DROPS OPHTHALMIC; TOPICAL at 21:12

## 2017-09-10 RX ADMIN — Medication: at 17:14

## 2017-09-10 NOTE — PROGRESS NOTE ADULT - ASSESSMENT
EKG - Afib RVR LVH  Echo 2016 - mild global LV dysfunction   Cath 2016 - Normal LM, LAD LCX RCA, D1 ostial 90%, treated medically     1) Afib RVR - continue eliquis, heart rate under better control but still elevated, increase lopressor to 150mg q12, repeat 2D echo    2) UTI/bacteremia  - proteus bacteremia on zosyn    3) Acute on chronic diastolic dysfunction - start IV lasix 40mg qd, cont 20mg IV lasix daily

## 2017-09-10 NOTE — PROGRESS NOTE ADULT - ASSESSMENT
Patient is an 82yoF with h/o CAD with ischemic cardiomyopathy (EF 52%), DM, Afib, HTN, HLD, DM with glaucoma presents with exertional dyspnea, found to have GNR bacteremia secondary to GNR UTI, Afib with RVR and hyponatremia. Improving. (+) cough. Pt developed low grade fever last night and wheezes diffusely, most likely due to volume overload/HF. Clinically not septic.

## 2017-09-10 NOTE — PROGRESS NOTE ADULT - SUBJECTIVE AND OBJECTIVE BOX
Pulmonary Consult Note    ANABELL TORRES  MRN-[id number]    Chief Complaint: Patient is a 82y old  Female who presents with a chief complaint of SOB and confusion. Unable to walk (07 Sep 2017 10:36)      HPI:  82yFemale  -chest congestion cough pos fever T max 100.8  -    ROS:  -A fib  CAD DM  Glaucoma    All other systems reviewed     ACTIVE MEDICATION LIST:  MEDICATIONS  (STANDING):  aspirin enteric coated 81 milliGRAM(s) Oral daily  lisinopril 40 milliGRAM(s) Oral daily  atorvastatin 80 milliGRAM(s) Oral at bedtime  dorzolamide 2% Ophthalmic Solution 1 Drop(s) Both EYES two times a day  pilocarpine 1% Solution 1 Drop(s) Both EYES two times a day  apixaban 5 milliGRAM(s) Oral every 12 hours  latanoprost 0.005% Ophthalmic Solution 1 Drop(s) Both EYES at bedtime  amLODIPine   Tablet 5 milliGRAM(s) Oral daily  insulin lispro (HumaLOG) corrective regimen sliding scale   SubCutaneous three times a day before meals  insulin lispro (HumaLOG) corrective regimen sliding scale   SubCutaneous at bedtime  dextrose 5%. 1000 milliLiter(s) (50 mL/Hr) IV Continuous <Continuous>  dextrose 50% Injectable 12.5 Gram(s) IV Push once  dextrose 50% Injectable 25 Gram(s) IV Push once  dextrose 50% Injectable 25 Gram(s) IV Push once  sodium chloride 0.9%. 1000 milliLiter(s) (75 mL/Hr) IV Continuous <Continuous>  piperacillin/tazobactam IVPB. 3.375 Gram(s) IV Intermittent every 8 hours  buDESOnide   0.5 milliGRAM(s) Respule 0.5 milliGRAM(s) Inhalation two times a day  metoprolol 150 milliGRAM(s) Oral every 12 hours    MEDICATIONS  (PRN):  dextrose Gel 1 Dose(s) Oral once PRN Blood Glucose LESS THAN 70 milliGRAM(s)/deciliter  glucagon  Injectable 1 milliGRAM(s) IntraMuscular once PRN Glucose LESS THAN 70 milligrams/deciliter  melatonin 3 milliGRAM(s) Oral at bedtime PRN Insomnia  ALBUTerol/ipratropium for Nebulization 3 milliLiter(s) Nebulizer every 6 hours PRN Shortness of Breath and/or Wheezing  guaiFENesin    Syrup 200 milliGRAM(s) Oral every 6 hours PRN Cough      EXAM:  Vital Signs Last 24 Hrs  T(C): 36.8 (10 Sep 2017 04:58), Max: 38.2 (09 Sep 2017 20:35)  T(F): 98.3 (10 Sep 2017 04:58), Max: 100.8 (09 Sep 2017 20:35)  HR: 106 (10 Sep 2017 04:58) (95 - 116)  BP: 110/55 (10 Sep 2017 04:58) (110/55 - 144/95)  BP(mean): --  RR: 18 (10 Sep 2017 04:58) (17 - 18)  SpO2: 98% (10 Sep 2017 04:58) (97% - 99%)    GENERAL: No acute distress  NEURO: Alert and oriented x 3  LUNGS: pos  end exp fine wheeze  CV: S1/S2, IR IR no murmur                        9.8    10.05 )-----------( 178      ( 09 Sep 2017 06:00 )             28.3   09-09  < from: CT Chest No Cont (09.08.17 @ 09:29) >  IMPRESSION:   Small bilateral pleural effusions are present.   Multiple calcified granulomas are present in the left lower lobe lung.  Cardiomegaly with left-sided prominence.  Mosaic attenuation right lower lobe likely from small airway disease.    < end of copied text >    138  |  98  |  12  ----------------------------<  122<H>  3.7   |  23  |  1.00    Ca    8.9      09 Sep 2017 17:50  Mg     1.9     09-09        PROBLEM LIST:  82yFemale with HEALTH ISSUES - PROBLEM Dx:  Bronchospasm likley underlying inflammatory airway disease  Hypokalemia: Hypokalemia  Glaucoma of both eyes, unspecified glaucoma type: Glaucoma of both eyes, unspecified glaucoma type  Coronary artery disease involving native coronary artery of native heart without angina pectoris: Coronary artery disease involving native coronary artery of native heart without angina pectoris  Sepsis due to Gram negative bacteria: Sepsis due to Gram negative bacteria  Hematuria: Hematuria  Other proteinuria: Other proteinuria  Hypomagnesemia: Hypomagnesemia  Need for prophylactic measure: Need for prophylactic measure  Glaucoma: Glaucoma  Essential hypertension: Essential hypertension  Type 2 diabetes mellitus without complication, unspecified long term insulin use status: Type 2 diabetes mellitus without complication, unspecified long term insulin use status  CAD (coronary artery disease): CAD (coronary artery disease)  Hyponatremia: Hyponatremia  UTI (urinary tract infection): UTI (urinary tract infection)  Metabolic encephalopathy: Metabolic encephalopathy  Atrial fibrillation with RVR: Atrial fibrillation with RVR            RECS:  -as noted per cardiac inc dosing beta  blocker   -would add singulair 10 mg  issue would  be conversion to  symbicort and LAMA ie spiriva   have held off steroids sec UTI on Zosyn  and now with fever breakthrough  would reculture bklood  urine    f/u repeat CXR  Thank you for this consultation, please feel free to call with any questions [phone]  Deo Orr DO [facility]

## 2017-09-10 NOTE — PROGRESS NOTE ADULT - PROBLEM SELECTOR PLAN 10
VTE PPx - on eliquis  d/w daughter bedside

## 2017-09-10 NOTE — PROGRESS NOTE ADULT - SUBJECTIVE AND OBJECTIVE BOX
Patient is a 82y old  Female who presents with a chief complaint of SOB and confusion. Unable to walk (07 Sep 2017 10:36)      SUBJECTIVE / OVERNIGHT EVENTS: (+) low grade fever of 100.8 last night. (+) wheezing.    MEDICATIONS  (STANDING):  aspirin enteric coated 81 milliGRAM(s) Oral daily  lisinopril 40 milliGRAM(s) Oral daily  atorvastatin 80 milliGRAM(s) Oral at bedtime  dorzolamide 2% Ophthalmic Solution 1 Drop(s) Both EYES two times a day  pilocarpine 1% Solution 1 Drop(s) Both EYES two times a day  apixaban 5 milliGRAM(s) Oral every 12 hours  latanoprost 0.005% Ophthalmic Solution 1 Drop(s) Both EYES at bedtime  amLODIPine   Tablet 5 milliGRAM(s) Oral daily  insulin lispro (HumaLOG) corrective regimen sliding scale   SubCutaneous three times a day before meals  insulin lispro (HumaLOG) corrective regimen sliding scale   SubCutaneous at bedtime  dextrose 5%. 1000 milliLiter(s) (50 mL/Hr) IV Continuous <Continuous>  dextrose 50% Injectable 12.5 Gram(s) IV Push once  dextrose 50% Injectable 25 Gram(s) IV Push once  dextrose 50% Injectable 25 Gram(s) IV Push once  sodium chloride 0.9%. 1000 milliLiter(s) (75 mL/Hr) IV Continuous <Continuous>  piperacillin/tazobactam IVPB. 3.375 Gram(s) IV Intermittent every 8 hours  buDESOnide   0.5 milliGRAM(s) Respule 0.5 milliGRAM(s) Inhalation two times a day  metoprolol 150 milliGRAM(s) Oral every 12 hours  montelukast 10 milliGRAM(s) Oral at bedtime  furosemide   Injectable 40 milliGRAM(s) IV Push once    MEDICATIONS  (PRN):  dextrose Gel 1 Dose(s) Oral once PRN Blood Glucose LESS THAN 70 milliGRAM(s)/deciliter  glucagon  Injectable 1 milliGRAM(s) IntraMuscular once PRN Glucose LESS THAN 70 milligrams/deciliter  melatonin 3 milliGRAM(s) Oral at bedtime PRN Insomnia  ALBUTerol/ipratropium for Nebulization 3 milliLiter(s) Nebulizer every 6 hours PRN Shortness of Breath and/or Wheezing  guaiFENesin    Syrup 200 milliGRAM(s) Oral every 6 hours PRN Cough      Vital Signs Last 24 Hrs  T(C): 36.8 (10 Sep 2017 04:58), Max: 38.2 (09 Sep 2017 20:35)  T(F): 98.3 (10 Sep 2017 04:58), Max: 100.8 (09 Sep 2017 20:35)  HR: 89 (10 Sep 2017 09:04) (89 - 116)  BP: 110/55 (10 Sep 2017 04:58) (110/55 - 144/95)  BP(mean): --  RR: 18 (10 Sep 2017 04:58) (17 - 18)  SpO2: 97% (10 Sep 2017 09:04) (97% - 99%)  CAPILLARY BLOOD GLUCOSE  159 (10 Sep 2017 07:58)  157 (09 Sep 2017 22:00)  123 (09 Sep 2017 15:36)  109 (09 Sep 2017 11:30)        I&O's Summary      PHYSICAL EXAM:  GENERAL: NAD, well-developed  HEAD:  Atraumatic, Normocephalic  EYES: EOMI, PERRLA, conjunctiva and sclera clear  NECK: Supple, No JVD  CHEST/LUNG: (+) mild wheeze diffusely, b/l  HEART: Regular rate and rhythm; No murmurs, rubs, or gallops  ABDOMEN: Soft, Nontender, Nondistended; Bowel sounds present  EXTREMITIES:  2+ Peripheral Pulses, No clubbing, cyanosis, or edema  PSYCH: AAOx3  NEUROLOGY: non-focal  SKIN: No rashes or lesions    LABS:                        10.3   8.96  )-----------( 205      ( 10 Sep 2017 06:30 )             30.6     09-10    130<L>  |  95<L>  |  13  ----------------------------<  134<H>  3.6   |  24  |  1.02    Ca    9.0      10 Sep 2017 06:30  Mg     2.0     09-10                RADIOLOGY & ADDITIONAL TESTS:    Imaging Personally Reviewed:    Consultant(s) Notes Reviewed:      Care Discussed with Consultants/Other Providers:

## 2017-09-10 NOTE — PROGRESS NOTE ADULT - PROBLEM SELECTOR PLAN 9
Hypomagnesemia - Repleted with magnesium sulfate and monitor.
c/w trusopt, xalatan and pilocarpine gtts
Hypomagnesemia - Repleted with magnesium sulfate and monitor.

## 2017-09-10 NOTE — PROGRESS NOTE ADULT - SUBJECTIVE AND OBJECTIVE BOX
INTERVAL HISTORY: pt is lying in bed comfortable, not in distress, SOB improving  	   MEDICATIONS:  aspirin enteric coated 81 milliGRAM(s) Oral daily  lisinopril 40 milliGRAM(s) Oral daily  apixaban 5 milliGRAM(s) Oral every 12 hours  amLODIPine   Tablet 5 milliGRAM(s) Oral daily  metoprolol 150 milliGRAM(s) Oral every 12 hours  piperacillin/tazobactam IVPB. 3.375 Gram(s) IV Intermittent every 8 hours  buDESOnide   0.5 milliGRAM(s) Respule 0.5 milliGRAM(s) Inhalation two times a day  ALBUTerol/ipratropium for Nebulization 3 milliLiter(s) Nebulizer every 6 hours PRN  guaiFENesin    Syrup 200 milliGRAM(s) Oral every 6 hours PRN  montelukast 10 milliGRAM(s) Oral at bedtime  melatonin 3 milliGRAM(s) Oral at bedtime PRN  atorvastatin 80 milliGRAM(s) Oral at bedtime  insulin lispro (HumaLOG) corrective regimen sliding scale   SubCutaneous three times a day before meals  insulin lispro (HumaLOG) corrective regimen sliding scale   SubCutaneous at bedtime  dextrose Gel 1 Dose(s) Oral once PRN  dextrose 50% Injectable 12.5 Gram(s) IV Push once  dextrose 50% Injectable 25 Gram(s) IV Push once  dextrose 50% Injectable 25 Gram(s) IV Push once  glucagon  Injectable 1 milliGRAM(s) IntraMuscular once PRN  dorzolamide 2% Ophthalmic Solution 1 Drop(s) Both EYES two times a day  pilocarpine 1% Solution 1 Drop(s) Both EYES two times a day  latanoprost 0.005% Ophthalmic Solution 1 Drop(s) Both EYES at bedtime  dextrose 5%. 1000 milliLiter(s) IV Continuous <Continuous>  sodium chloride 0.9%. 1000 milliLiter(s) IV Continuous <Continuous>      PHYSICAL EXAM:  T(C): 37.2 (09-10-17 @ 09:21), Max: 38.2 (09-09-17 @ 20:35)  HR: 102 (09-10-17 @ 10:20) (89 - 116)  BP: 126/86 (09-10-17 @ 10:20) (110/55 - 144/95)  RR: 18 (09-10-17 @ 09:21) (17 - 18)  SpO2: 97% (09-10-17 @ 09:21) (97% - 99%)  Wt(kg): --  I&O's Summary    09 Sep 2017 07:01  -  10 Sep 2017 07:00  --------------------------------------------------------  IN: 25 mL / OUT: 0 mL / NET: 25 mL    10 Sep 2017 07:01  -  10 Sep 2017 12:02  --------------------------------------------------------  IN: 126 mL / OUT: 400 mL / NET: -274 mL          Appearance: Normal	  HEENT:   Normal oral mucosa, PERRL, EOMI	  Cardiovascular: Normal S1 S2, No JVD, No murmurs, No edema  Respiratory: bibasilar crackles  Gastrointestinal:  Soft, Non-tender, + BS	  Extremities: Normal range of motion, No clubbing, cyanosis or edema                                    10.3   8.96  )-----------( 205      ( 10 Sep 2017 06:30 )             30.6     09-10    130<L>  |  95<L>  |  13  ----------------------------<  134<H>  3.6   |  24  |  1.02    Ca    9.0      10 Sep 2017 06:30  Mg     2.0     09-10      proBNP: Serum Pro-Brain Natriuretic Peptide: 5528 pg/mL (09-10 @ 06:30)    Lipid Profile:   HgA1c:   TSH:

## 2017-09-10 NOTE — PROGRESS NOTE ADULT - ASSESSMENT
1.	HYPONATREMIA:  She is clinically euvolemic at present, with Hypo-Osmolality and normal TSH with adequate Adrenal Cortisol response. 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 She is asymptomatic at this point.    PLAN:  1.	Urine Osmolality.  2.	Serum Uric acid   3.	Follow up BMP.  4.	Fluid restriction for now until more information is available.      Denzel Serrano MD 1.	HYPONATREMIA:  She is clinically euvolemic at present, with Hypo-Osmolality and normal TSH with adequate Adrenal Cortisol response. 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 She is asymptomatic at this point.    PLAN:  1.	Urine Osmolality.  2.	Serum Uric acid   3.	Follow up BMP.  4.	Fluid restriction for now until more information is available.      Denzel Serrano MD    [phone]

## 2017-09-10 NOTE — PROGRESS NOTE ADULT - SUBJECTIVE AND OBJECTIVE BOX
ANABELL TORRES    HPI:  This is an 82 year old admitted for dyspnea, cough and weakness. She is followed for hyponatremia. She takes no Thiazides or antidepressants. She denies thirst or polydipsia.    HEALTH ISSUES - PROBLEM Dx:  Hypokalemia: Hypokalemia  Glaucoma of both eyes, unspecified glaucoma type: Glaucoma of both eyes, unspecified glaucoma type  Coronary artery disease involving native coronary artery of native heart without angina pectoris: Coronary artery disease involving native coronary artery of native heart without angina pectoris  Sepsis due to Gram negative bacteria: Sepsis due to Gram negative bacteria  Hematuria: Hematuria  Other proteinuria: Other proteinuria  Hypomagnesemia: Hypomagnesemia  Need for prophylactic measure: Need for prophylactic measure  Glaucoma: Glaucoma  Essential hypertension: Essential hypertension  Type 2 diabetes mellitus without complication, unspecified long term insulin use status: Type 2 diabetes mellitus without complication, unspecified long term insulin use status  CAD (coronary artery disease): CAD (coronary artery disease)  Hyponatremia: Hyponatremia  UTI (urinary tract infection): UTI (urinary tract infection)  Metabolic encephalopathy: Metabolic encephalopathy  Atrial fibrillation with RVR: Atrial fibrillation with RVR        MEDICATIONS  (STANDING):  aspirin enteric coated 81 milliGRAM(s) Oral daily  lisinopril 40 milliGRAM(s) Oral daily  atorvastatin 80 milliGRAM(s) Oral at bedtime  dorzolamide 2% Ophthalmic Solution 1 Drop(s) Both EYES two times a day  pilocarpine 1% Solution 1 Drop(s) Both EYES two times a day  apixaban 5 milliGRAM(s) Oral every 12 hours  latanoprost 0.005% Ophthalmic Solution 1 Drop(s) Both EYES at bedtime  amLODIPine   Tablet 5 milliGRAM(s) Oral daily  insulin lispro (HumaLOG) corrective regimen sliding scale   SubCutaneous three times a day before meals  insulin lispro (HumaLOG) corrective regimen sliding scale   SubCutaneous at bedtime  dextrose 5%. 1000 milliLiter(s) (50 mL/Hr) IV Continuous <Continuous>  dextrose 50% Injectable 12.5 Gram(s) IV Push once  dextrose 50% Injectable 25 Gram(s) IV Push once  dextrose 50% Injectable 25 Gram(s) IV Push once  sodium chloride 0.9%. 1000 milliLiter(s) (75 mL/Hr) IV Continuous <Continuous>  piperacillin/tazobactam IVPB. 3.375 Gram(s) IV Intermittent every 8 hours  buDESOnide   0.5 milliGRAM(s) Respule 0.5 milliGRAM(s) Inhalation two times a day  metoprolol 150 milliGRAM(s) Oral every 12 hours  montelukast 10 milliGRAM(s) Oral at bedtime  furosemide   Injectable 40 milliGRAM(s) IV Push once    MEDICATIONS  (PRN):  dextrose Gel 1 Dose(s) Oral once PRN Blood Glucose LESS THAN 70 milliGRAM(s)/deciliter  glucagon  Injectable 1 milliGRAM(s) IntraMuscular once PRN Glucose LESS THAN 70 milligrams/deciliter  melatonin 3 milliGRAM(s) Oral at bedtime PRN Insomnia  ALBUTerol/ipratropium for Nebulization 3 milliLiter(s) Nebulizer every 6 hours PRN Shortness of Breath and/or Wheezing  guaiFENesin    Syrup 200 milliGRAM(s) Oral every 6 hours PRN Cough    Vital Signs Last 24 Hrs  T(C): 37.2 (10 Sep 2017 09:21), Max: 38.2 (09 Sep 2017 20:35)  T(F): 98.9 (10 Sep 2017 09:21), Max: 100.8 (09 Sep 2017 20:35)  HR: 94 (10 Sep 2017 09:21) (89 - 116)  BP: 138/93 (10 Sep 2017 09:21) (110/55 - 144/95)  BP(mean): --  RR: 18 (10 Sep 2017 09:21) (17 - 18)  SpO2: 97% (10 Sep 2017 09:21) (97% - 99%)  Daily     Daily     PHYSICAL EXAM:  Constitutional:  She appears comfortable and not distressed. Not diaphoretic. Oral mucosa is normal.    Neck:  The thyroid is normal. Trachea is midline.     Respiratory: The lungs are clear to auscultation. No dullness and expansion is normal.    Cardiovascular: S1 and S2 are normal. No mummurs, rubs or gallops are present.    Gastrointestinal: The abdomen is soft. No tenderness is present. No masses are present. Bowel sounds are normal.    Genitourinary: The bladder is not distended. No CVA tenderness is present.    Extremities: No edema is noted. No deformities are present.    Neurological: Cognition is normal. Tone, power and sensation are normal. Gait is unsteady.    Skin: No lesions are seen  or palpated.    Lymph Nodes: No lymphadenopathy is present.    Psychiatric: Mood is appropriate. No hallucinations or flight of ideas are noted.                              10.3   8.96  )-----------( 205      ( 10 Sep 2017 06:30 )             30.6     09-10    130<L>  |  95<L>  |  13  ----------------------------<  134<H>  3.6   |  24  |  1.02    Ca    9.0      10 Sep 2017 06:30  Mg     2.0     09-10      Osmolality, Serum: 264 mosmo/kg (09.08.17 @ 06:50)    Sodium, Random Urine: 81: Reference range not established for this test meq/L (09.07.17 @ 23:47)

## 2017-09-11 DIAGNOSIS — I50.33 ACUTE ON CHRONIC DIASTOLIC (CONGESTIVE) HEART FAILURE: ICD-10-CM

## 2017-09-11 LAB
BACTERIA BLD CULT: SIGNIFICANT CHANGE UP
BUN SERPL-MCNC: 13 MG/DL — SIGNIFICANT CHANGE UP (ref 7–23)
CALCIUM SERPL-MCNC: 9 MG/DL — SIGNIFICANT CHANGE UP (ref 8.4–10.5)
CHLORIDE SERPL-SCNC: 92 MMOL/L — LOW (ref 98–107)
CO2 SERPL-SCNC: 28 MMOL/L — SIGNIFICANT CHANGE UP (ref 22–31)
CREAT SERPL-MCNC: 1.11 MG/DL — SIGNIFICANT CHANGE UP (ref 0.5–1.3)
GLUCOSE SERPL-MCNC: 151 MG/DL — HIGH (ref 70–99)
HCT VFR BLD CALC: 29.4 % — LOW (ref 34.5–45)
HGB BLD-MCNC: 10 G/DL — LOW (ref 11.5–15.5)
MAGNESIUM SERPL-MCNC: 1.8 MG/DL — SIGNIFICANT CHANGE UP (ref 1.6–2.6)
MCHC RBC-ENTMCNC: 25.3 PG — LOW (ref 27–34)
MCHC RBC-ENTMCNC: 34 % — SIGNIFICANT CHANGE UP (ref 32–36)
MCV RBC AUTO: 74.4 FL — LOW (ref 80–100)
NRBC # FLD: 0 — SIGNIFICANT CHANGE UP
NT-PROBNP SERPL-SCNC: 3767 PG/ML — SIGNIFICANT CHANGE UP
ORGANISM # SPEC MICROSCOPIC CNT: SIGNIFICANT CHANGE UP
PLATELET # BLD AUTO: 259 K/UL — SIGNIFICANT CHANGE UP (ref 150–400)
PMV BLD: 11.1 FL — SIGNIFICANT CHANGE UP (ref 7–13)
POTASSIUM SERPL-MCNC: 3.4 MMOL/L — LOW (ref 3.5–5.3)
POTASSIUM SERPL-SCNC: 3.4 MMOL/L — LOW (ref 3.5–5.3)
PROTEUS SP DNA BLD POS QL NAA+NON-PROBE: SIGNIFICANT CHANGE UP
RBC # BLD: 3.95 M/UL — SIGNIFICANT CHANGE UP (ref 3.8–5.2)
RBC # FLD: 16.3 % — HIGH (ref 10.3–14.5)
SODIUM SERPL-SCNC: 134 MMOL/L — LOW (ref 135–145)
WBC # BLD: 7.97 K/UL — SIGNIFICANT CHANGE UP (ref 3.8–10.5)
WBC # FLD AUTO: 7.97 K/UL — SIGNIFICANT CHANGE UP (ref 3.8–10.5)

## 2017-09-11 PROCEDURE — 93306 TTE W/DOPPLER COMPLETE: CPT | Mod: 26

## 2017-09-11 PROCEDURE — 99233 SBSQ HOSP IP/OBS HIGH 50: CPT

## 2017-09-11 RX ORDER — POTASSIUM CHLORIDE 20 MEQ
40 PACKET (EA) ORAL ONCE
Qty: 0 | Refills: 0 | Status: COMPLETED | OUTPATIENT
Start: 2017-09-11 | End: 2017-09-11

## 2017-09-11 RX ORDER — CEFTRIAXONE 500 MG/1
1 INJECTION, POWDER, FOR SOLUTION INTRAMUSCULAR; INTRAVENOUS EVERY 24 HOURS
Qty: 0 | Refills: 0 | Status: DISCONTINUED | OUTPATIENT
Start: 2017-09-11 | End: 2017-09-13

## 2017-09-11 RX ADMIN — Medication 3 MILLIGRAM(S): at 21:00

## 2017-09-11 RX ADMIN — LISINOPRIL 40 MILLIGRAM(S): 2.5 TABLET ORAL at 05:12

## 2017-09-11 RX ADMIN — Medication 150 MILLIGRAM(S): at 05:09

## 2017-09-11 RX ADMIN — ATORVASTATIN CALCIUM 80 MILLIGRAM(S): 80 TABLET, FILM COATED ORAL at 21:00

## 2017-09-11 RX ADMIN — DORZOLAMIDE HYDROCHLORIDE 1 DROP(S): 20 SOLUTION/ DROPS OPHTHALMIC at 05:18

## 2017-09-11 RX ADMIN — Medication 20 MILLIGRAM(S): at 05:13

## 2017-09-11 RX ADMIN — Medication 40 MILLIEQUIVALENT(S): at 11:29

## 2017-09-11 RX ADMIN — Medication 0.5 MILLIGRAM(S): at 21:51

## 2017-09-11 RX ADMIN — Medication 81 MILLIGRAM(S): at 11:29

## 2017-09-11 RX ADMIN — Medication 200 MILLIGRAM(S): at 21:00

## 2017-09-11 RX ADMIN — Medication 0.5 MILLIGRAM(S): at 09:22

## 2017-09-11 RX ADMIN — MONTELUKAST 10 MILLIGRAM(S): 4 TABLET, CHEWABLE ORAL at 21:00

## 2017-09-11 RX ADMIN — LATANOPROST 1 DROP(S): 0.05 SOLUTION/ DROPS OPHTHALMIC; TOPICAL at 21:00

## 2017-09-11 RX ADMIN — CEFTRIAXONE 100 GRAM(S): 500 INJECTION, POWDER, FOR SOLUTION INTRAMUSCULAR; INTRAVENOUS at 12:48

## 2017-09-11 RX ADMIN — APIXABAN 5 MILLIGRAM(S): 2.5 TABLET, FILM COATED ORAL at 05:09

## 2017-09-11 RX ADMIN — Medication 1: at 11:29

## 2017-09-11 RX ADMIN — DORZOLAMIDE HYDROCHLORIDE 1 DROP(S): 20 SOLUTION/ DROPS OPHTHALMIC at 17:09

## 2017-09-11 RX ADMIN — AMLODIPINE BESYLATE 5 MILLIGRAM(S): 2.5 TABLET ORAL at 05:09

## 2017-09-11 RX ADMIN — PIPERACILLIN AND TAZOBACTAM 25 GRAM(S): 4; .5 INJECTION, POWDER, LYOPHILIZED, FOR SOLUTION INTRAVENOUS at 05:18

## 2017-09-11 RX ADMIN — Medication 150 MILLIGRAM(S): at 17:09

## 2017-09-11 RX ADMIN — Medication 200 MILLIGRAM(S): at 05:07

## 2017-09-11 RX ADMIN — Medication 1 DROP(S): at 17:09

## 2017-09-11 RX ADMIN — Medication 1 DROP(S): at 05:18

## 2017-09-11 RX ADMIN — APIXABAN 5 MILLIGRAM(S): 2.5 TABLET, FILM COATED ORAL at 17:08

## 2017-09-11 NOTE — PROGRESS NOTE ADULT - SUBJECTIVE AND OBJECTIVE BOX
INTERVAL HISTORY: pt is lying in bed comfortable, not in distress, no cp SOB improving  	  MEDICATIONS:  aspirin enteric coated 81 milliGRAM(s) Oral daily  lisinopril 40 milliGRAM(s) Oral daily  apixaban 5 milliGRAM(s) Oral every 12 hours  amLODIPine   Tablet 5 milliGRAM(s) Oral daily  metoprolol 150 milliGRAM(s) Oral every 12 hours  furosemide   Injectable 20 milliGRAM(s) IV Push daily  diltiazem    Tablet 30 milliGRAM(s) Oral every 6 hours    cefTRIAXone   IVPB 1 Gram(s) IV Intermittent every 24 hours    buDESOnide   0.5 milliGRAM(s) Respule 0.5 milliGRAM(s) Inhalation two times a day  ALBUTerol/ipratropium for Nebulization 3 milliLiter(s) Nebulizer every 6 hours PRN  guaiFENesin    Syrup 200 milliGRAM(s) Oral every 6 hours PRN  montelukast 10 milliGRAM(s) Oral at bedtime    melatonin 3 milliGRAM(s) Oral at bedtime PRN      atorvastatin 80 milliGRAM(s) Oral at bedtime  insulin lispro (HumaLOG) corrective regimen sliding scale   SubCutaneous three times a day before meals  insulin lispro (HumaLOG) corrective regimen sliding scale   SubCutaneous at bedtime  dextrose Gel 1 Dose(s) Oral once PRN  dextrose 50% Injectable 12.5 Gram(s) IV Push once  dextrose 50% Injectable 25 Gram(s) IV Push once  dextrose 50% Injectable 25 Gram(s) IV Push once  glucagon  Injectable 1 milliGRAM(s) IntraMuscular once PRN    dorzolamide 2% Ophthalmic Solution 1 Drop(s) Both EYES two times a day  pilocarpine 1% Solution 1 Drop(s) Both EYES two times a day  latanoprost 0.005% Ophthalmic Solution 1 Drop(s) Both EYES at bedtime  dextrose 5%. 1000 milliLiter(s) IV Continuous <Continuous>  sodium chloride 0.9%. 1000 milliLiter(s) IV Continuous <Continuous>      PHYSICAL EXAM:  T(C): 36.6 (09-11-17 @ 09:29), Max: 37.2 (09-10-17 @ 19:53)  HR: 86 (09-11-17 @ 09:29) (61 - 121)  BP: 110/77 (09-11-17 @ 09:29) (110/77 - 140/89)  RR: 18 (09-11-17 @ 09:29) (18 - 18)  SpO2: 100% (09-11-17 @ 09:29) (97% - 100%)  Wt(kg): --  I&O's Summary    10 Sep 2017 07:01  -  11 Sep 2017 07:00  --------------------------------------------------------  IN: 512 mL / OUT: 1800 mL / NET: -1288 mL          Appearance: Normal	  HEENT:   Normal oral mucosa, PERRL, EOMI	  Cardiovascular: Normal S1 S2, No JVD, No murmurs, No edema  Respiratory: Lungs clear to auscultation	  Gastrointestinal:  Soft, Non-tender, + BS	  Extremities: Normal range of motion, No clubbing, cyanosis or edema                                    10.0   7.97  )-----------( 259      ( 11 Sep 2017 06:15 )             29.4     09-11    134<L>  |  92<L>  |  13  ----------------------------<  151<H>  3.4<L>   |  28  |  1.11    Ca    9.0      11 Sep 2017 06:15  Mg     1.8     09-11      proBNP:   Lipid Profile:   HgA1c:   TSH:

## 2017-09-11 NOTE — PROGRESS NOTE ADULT - PROBLEM SELECTOR PLAN 4
Check Urine OSM  TSH, AM Cortisol WNL  Patient was hypovolemic initially - suspect hypovolemic hypotonic hyponatremia 2/2 insensible losses from sepsis +/- component of SIADH  - stabilizing

## 2017-09-11 NOTE — PROGRESS NOTE ADULT - SUBJECTIVE AND OBJECTIVE BOX
CC:  Proteus bacteremia, LESLY    SUBJECTIVE / OVERNIGHT EVENTS:  Granddaughter at bedside.  Pt says she's doing ok.  Denies chest pain, palpitations, SOB.    MEDICATIONS  (STANDING):  aspirin enteric coated 81 milliGRAM(s) Oral daily  lisinopril 40 milliGRAM(s) Oral daily  atorvastatin 80 milliGRAM(s) Oral at bedtime  dorzolamide 2% Ophthalmic Solution 1 Drop(s) Both EYES two times a day  pilocarpine 1% Solution 1 Drop(s) Both EYES two times a day  apixaban 5 milliGRAM(s) Oral every 12 hours  latanoprost 0.005% Ophthalmic Solution 1 Drop(s) Both EYES at bedtime  amLODIPine   Tablet 5 milliGRAM(s) Oral daily  insulin lispro (HumaLOG) corrective regimen sliding scale   SubCutaneous three times a day before meals  insulin lispro (HumaLOG) corrective regimen sliding scale   SubCutaneous at bedtime  dextrose 5%. 1000 milliLiter(s) (50 mL/Hr) IV Continuous <Continuous>  dextrose 50% Injectable 12.5 Gram(s) IV Push once  dextrose 50% Injectable 25 Gram(s) IV Push once  dextrose 50% Injectable 25 Gram(s) IV Push once  sodium chloride 0.9%. 1000 milliLiter(s) (75 mL/Hr) IV Continuous <Continuous>  buDESOnide   0.5 milliGRAM(s) Respule 0.5 milliGRAM(s) Inhalation two times a day  metoprolol 150 milliGRAM(s) Oral every 12 hours  montelukast 10 milliGRAM(s) Oral at bedtime  furosemide   Injectable 20 milliGRAM(s) IV Push daily  cefTRIAXone   IVPB 1 Gram(s) IV Intermittent every 24 hours  diltiazem    Tablet 30 milliGRAM(s) Oral every 6 hours    MEDICATIONS  (PRN):  dextrose Gel 1 Dose(s) Oral once PRN Blood Glucose LESS THAN 70 milliGRAM(s)/deciliter  glucagon  Injectable 1 milliGRAM(s) IntraMuscular once PRN Glucose LESS THAN 70 milligrams/deciliter  melatonin 3 milliGRAM(s) Oral at bedtime PRN Insomnia  ALBUTerol/ipratropium for Nebulization 3 milliLiter(s) Nebulizer every 6 hours PRN Shortness of Breath and/or Wheezing  guaiFENesin    Syrup 200 milliGRAM(s) Oral every 6 hours PRN Cough    CAPILLARY BLOOD GLUCOSE  127 (11 Sep 2017 16:02)  153 (11 Sep 2017 11:36)  147 (11 Sep 2017 07:37)  183 (10 Sep 2017 21:37)    Vital Signs Last 24 Hrs  T(C): 36.6 (11 Sep 2017 09:29), Max: 37.2 (10 Sep 2017 19:53)  T(F): 97.8 (11 Sep 2017 09:29), Max: 98.9 (10 Sep 2017 19:53)  HR: 60 (11 Sep 2017 16:19) (60 - 121)  BP: 128/78 (11 Sep 2017 16:19) (110/77 - 140/89)  RR: 18 (11 Sep 2017 16:19) (18 - 18)  SpO2: 100% (11 Sep 2017 16:19) (97% - 100%)    PHYSICAL EXAM:  GENERAL: NAD, well-developed  HEAD:  Atraumatic, Normocephalic  EYES: EOMI, PERRLA, conjunctiva and sclera clear  NECK: Supple, No JVD  CHEST/LUNG: Clear to auscultation bilaterally; No wheeze  HEART: irreg, irreg  ABDOMEN: Soft, Nontender, Nondistended; Bowel sounds present  EXTREMITIES:  2+ Peripheral Pulses, No clubbing, cyanosis, or edema  PSYCH: AAOx3  NEUROLOGY: non-focal  SKIN: No rashes or lesions    LABS:             10.0   7.97  )-----------( 259      ( 11 Sep 2017 06:15 )             29.4     134<L>  |  92<L>  |  13  ----------------------------<  151<H>  3.4<L>   |  28  |  1.11    Ca    9.0      11 Sep 2017 06:15  Mg     1.8     09-11    RADIOLOGY & ADDITIONAL TESTS:    < from: Transthoracic Echocardiogram (09.11.17 @ 07:58) >  1. Mitral annular calcification, otherwise normal mitral  valve. Mild-moderate mitral regurgitation.  2. Calcified trileaflet aortic valve with normal opening.  Mild aortic regurgitation.  3. Normal left ventricular internal dimensions and wall  thicknesses.  4. Normal left ventricular systolic function. No segmental  wall motion abnormalities.  5. Normal right ventricular size and function.  6. Estimated pulmonary artery systolic pressure equals 31  mm Hg, assuming right atrial pressure equals 10  mm Hg,  consistent with normal pulmonary pressures.  EF 60%    Imaging Personally Reviewed:    Consultant(s) Notes Reviewed:      Care Discussed with Consultants/Other Providers: cardiology re rate control, lasix

## 2017-09-11 NOTE — PROGRESS NOTE ADULT - SUBJECTIVE AND OBJECTIVE BOX
Pulmonary Consult Note    ANABELL TORRES  MRN-[id number]    Chief Complaint: Patient is a 82y old  Female who presents with a chief complaint of SOB and confusion. Unable to walk (07 Sep 2017 10:36)      HPI:  82yFemale   -denies sob, cough, wheeze    ROS:  All other systems reviewed and negative    SOCIAL HISTORY: nonsmoker    MEDICATIONS  (STANDING):  aspirin enteric coated 81 milliGRAM(s) Oral daily  lisinopril 40 milliGRAM(s) Oral daily  atorvastatin 80 milliGRAM(s) Oral at bedtime  dorzolamide 2% Ophthalmic Solution 1 Drop(s) Both EYES two times a day  pilocarpine 1% Solution 1 Drop(s) Both EYES two times a day  apixaban 5 milliGRAM(s) Oral every 12 hours  latanoprost 0.005% Ophthalmic Solution 1 Drop(s) Both EYES at bedtime  amLODIPine   Tablet 5 milliGRAM(s) Oral daily  insulin lispro (HumaLOG) corrective regimen sliding scale   SubCutaneous three times a day before meals  insulin lispro (HumaLOG) corrective regimen sliding scale   SubCutaneous at bedtime  dextrose 5%. 1000 milliLiter(s) (50 mL/Hr) IV Continuous <Continuous>  dextrose 50% Injectable 12.5 Gram(s) IV Push once  dextrose 50% Injectable 25 Gram(s) IV Push once  dextrose 50% Injectable 25 Gram(s) IV Push once  sodium chloride 0.9%. 1000 milliLiter(s) (75 mL/Hr) IV Continuous <Continuous>  buDESOnide   0.5 milliGRAM(s) Respule 0.5 milliGRAM(s) Inhalation two times a day  metoprolol 150 milliGRAM(s) Oral every 12 hours  montelukast 10 milliGRAM(s) Oral at bedtime  furosemide   Injectable 20 milliGRAM(s) IV Push daily  cefTRIAXone   IVPB 1 Gram(s) IV Intermittent every 24 hours  diltiazem    Tablet 30 milliGRAM(s) Oral every 6 hours    MEDICATIONS  (PRN):  dextrose Gel 1 Dose(s) Oral once PRN Blood Glucose LESS THAN 70 milliGRAM(s)/deciliter  glucagon  Injectable 1 milliGRAM(s) IntraMuscular once PRN Glucose LESS THAN 70 milligrams/deciliter  melatonin 3 milliGRAM(s) Oral at bedtime PRN Insomnia  ALBUTerol/ipratropium for Nebulization 3 milliLiter(s) Nebulizer every 6 hours PRN Shortness of Breath and/or Wheezing  guaiFENesin    Syrup 200 milliGRAM(s) Oral every 6 hours PRN Cough      Vital Signs Last 24 Hrs  T(C): 36.6 (11 Sep 2017 09:29), Max: 37.2 (10 Sep 2017 19:53)  T(F): 97.8 (11 Sep 2017 09:29), Max: 98.9 (10 Sep 2017 19:53)  HR: 86 (11 Sep 2017 09:29) (61 - 121)  BP: 110/77 (11 Sep 2017 09:29) (110/77 - 140/89)  BP(mean): --  RR: 18 (11 Sep 2017 09:29) (18 - 18)  SpO2: 100% (11 Sep 2017 09:29) (97% - 100%)    GENERAL: No acute distress  NEURO: Alert and oriented x 3  LUNGS: Clear to auscultation bilaterally, no rales or wheezes  CV: S1/S2      LABS/IMAGING: reviewed                          10.0   7.97  )-----------( 259      ( 11 Sep 2017 06:15 )             29.4     09-11    134<L>  |  92<L>  |  13  ----------------------------<  151<H>  3.4<L>   |  28  |  1.11    Ca    9.0      11 Sep 2017 06:15  Mg     1.8     09-11        < from: Xray Chest 1 View AP/PA (09.07.17 @ 07:01) >  IMPRESSION:   Clear lungs.    < end of copied text >    PROBLEM LIST:  82yFemale with HEALTH ISSUES - PROBLEM Dx:  cough/wheeze  Essential hypertension  Type 2 diabetes mellitus   CAD (coronary artery disease)  Hyponatremia  UTI (urinary tract infection)  Atrial fibrillation with RVR    RECS:  -Patient improved  -continue pulmicort nebs bid, singulair, duoneb Q6 PRN sob/cough/wheeze  -Cont abx for uti and bacteremia  -afib: rate control, ac, cards      [phone]  Pinky Preston MD

## 2017-09-11 NOTE — PROGRESS NOTE ADULT - ASSESSMENT
Patient is an 82yoF with h/o CAD with ischemic cardiomyopathy (EF 52%), DM, Afib, HTN, HLD, DM with glaucoma presents with exertional dyspnea, found to have GNR bacteremia secondary to GNR UTI, Afib with RVR and hyponatremia. Improving. (+) cough. Pt developed low grade fever several days ago with wheezes diffusely, most likely due to volume overload/HF.

## 2017-09-11 NOTE — PROGRESS NOTE ADULT - SUBJECTIVE AND OBJECTIVE BOX
Dr. Ramirez (Nephrology)  Office [phone]  Cell [phone]  [place] [facility]  Cell [phone]      Patient is a 82y old  Female who presents with a chief complaint of SOB and confusion. Unable to walk (07 Sep 2017 10:36)      Patient seen and examined at bedside. No chest pain/sob    VITALS:  T(F): 97.8 (09-11-17 @ 09:29), Max: 98.9 (09-10-17 @ 19:53)  HR: 60 (09-11-17 @ 16:19)  BP: 128/78 (09-11-17 @ 16:19)  RR: 18 (09-11-17 @ 16:19)  SpO2: 100% (09-11-17 @ 16:19)  Wt(kg): --    09-10 @ 07:01  -  09-11 @ 07:00  --------------------------------------------------------  IN: 512 mL / OUT: 1800 mL / NET: -1288 mL          PHYSICAL EXAM:  Constitutional: NAD  Neck: No JVD  Respiratory: CTAB, no wheezes, rales or rhonchi  Cardiovascular: S1, S2, RRR  Gastrointestinal: BS+, soft, NT/ND  Extremities: No peripheral edema    Hospital Medications:   MEDICATIONS  (STANDING):  aspirin enteric coated 81 milliGRAM(s) Oral daily  lisinopril 40 milliGRAM(s) Oral daily  atorvastatin 80 milliGRAM(s) Oral at bedtime  dorzolamide 2% Ophthalmic Solution 1 Drop(s) Both EYES two times a day  pilocarpine 1% Solution 1 Drop(s) Both EYES two times a day  apixaban 5 milliGRAM(s) Oral every 12 hours  latanoprost 0.005% Ophthalmic Solution 1 Drop(s) Both EYES at bedtime  amLODIPine   Tablet 5 milliGRAM(s) Oral daily  insulin lispro (HumaLOG) corrective regimen sliding scale   SubCutaneous three times a day before meals  insulin lispro (HumaLOG) corrective regimen sliding scale   SubCutaneous at bedtime  dextrose 5%. 1000 milliLiter(s) (50 mL/Hr) IV Continuous <Continuous>  dextrose 50% Injectable 12.5 Gram(s) IV Push once  dextrose 50% Injectable 25 Gram(s) IV Push once  dextrose 50% Injectable 25 Gram(s) IV Push once  sodium chloride 0.9%. 1000 milliLiter(s) (75 mL/Hr) IV Continuous <Continuous>  buDESOnide   0.5 milliGRAM(s) Respule 0.5 milliGRAM(s) Inhalation two times a day  metoprolol 150 milliGRAM(s) Oral every 12 hours  montelukast 10 milliGRAM(s) Oral at bedtime  furosemide   Injectable 20 milliGRAM(s) IV Push daily  cefTRIAXone   IVPB 1 Gram(s) IV Intermittent every 24 hours  diltiazem    Tablet 30 milliGRAM(s) Oral every 6 hours      LABS:  09-11    134<L>  |  92<L>  |  13  ----------------------------<  151<H>  3.4<L>   |  28  |  1.11    Ca    9.0      11 Sep 2017 06:15  Mg     1.8     09-11      Creatinine Trend: 1.11 <--, 1.02 <--, 1.00 <--, 1.02 <--, 0.92 <--, 0.85 <--, 0.92 <--, 0.69 <--, 1.08 <--                                10.0   7.97  )-----------( 259      ( 11 Sep 2017 06:15 )             29.4     Urine Studies:  Urinalysis - [09-07-17 @ 04:20]      Color YELLOW / Appearance CLEAR / SG 1.009 / pH 7.0      Gluc NEGATIVE / Ketone TRACE  / Bili NEGATIVE / Urobili NORMAL       Blood MODERATE / Protein 100 / Leuk Est LARGE / Nitrite NEGATIVE      RBC 5-10 / WBC 10-25 / Hyaline  / Gran  / Sq Epi OCC / Non Sq Epi  / Bacteria FEW    Urine Sodium 81      [09-07-17 @ 23:47]  Urine Osmolality 266      [09-10-17 @ 10:10]    HbA1c 6.9      [09-08-17 @ 06:50]  TSH 0.59      [09-08-17 @ 06:50]  Lipid: chol 74, TG 87, HDL 6, LDL 21      [09-08-17 @ 06:50]        RADIOLOGY & ADDITIONAL STUDIES:

## 2017-09-11 NOTE — PROGRESS NOTE ADULT - ASSESSMENT
EKG - Afib RVR LVH  Echo 2017- normal LV function   Cath 2016 - Normal LM, LAD LCX RCA, D1 ostial 90%, treated medically     1) Afib RVR - continue eliquis, heart rate under better control but still elevated,  lopressor  150mg q12,  2D echo shows normal LV, add cardizem 30mg q6    2) UTI/bacteremia  - proteus bacteremia ceftriaxone    3) Acute on chronic diastolic dysfunction -  cont 20mg IV lasix daily

## 2017-09-12 LAB
BUN SERPL-MCNC: 14 MG/DL — SIGNIFICANT CHANGE UP (ref 7–23)
CALCIUM SERPL-MCNC: 9.1 MG/DL — SIGNIFICANT CHANGE UP (ref 8.4–10.5)
CHLORIDE SERPL-SCNC: 93 MMOL/L — LOW (ref 98–107)
CO2 SERPL-SCNC: 26 MMOL/L — SIGNIFICANT CHANGE UP (ref 22–31)
CREAT SERPL-MCNC: 1.13 MG/DL — SIGNIFICANT CHANGE UP (ref 0.5–1.3)
GLUCOSE SERPL-MCNC: 138 MG/DL — HIGH (ref 70–99)
POTASSIUM SERPL-MCNC: 3.5 MMOL/L — SIGNIFICANT CHANGE UP (ref 3.5–5.3)
POTASSIUM SERPL-SCNC: 3.5 MMOL/L — SIGNIFICANT CHANGE UP (ref 3.5–5.3)
SODIUM SERPL-SCNC: 134 MMOL/L — LOW (ref 135–145)

## 2017-09-12 PROCEDURE — 99233 SBSQ HOSP IP/OBS HIGH 50: CPT

## 2017-09-12 RX ORDER — FUROSEMIDE 40 MG
20 TABLET ORAL DAILY
Qty: 0 | Refills: 0 | Status: DISCONTINUED | OUTPATIENT
Start: 2017-09-12 | End: 2017-09-12

## 2017-09-12 RX ORDER — FUROSEMIDE 40 MG
20 TABLET ORAL DAILY
Qty: 0 | Refills: 0 | Status: DISCONTINUED | OUTPATIENT
Start: 2017-09-13 | End: 2017-09-13

## 2017-09-12 RX ORDER — POTASSIUM CHLORIDE 20 MEQ
40 PACKET (EA) ORAL ONCE
Qty: 0 | Refills: 0 | Status: COMPLETED | OUTPATIENT
Start: 2017-09-12 | End: 2017-09-12

## 2017-09-12 RX ADMIN — DORZOLAMIDE HYDROCHLORIDE 1 DROP(S): 20 SOLUTION/ DROPS OPHTHALMIC at 05:46

## 2017-09-12 RX ADMIN — Medication 0.5 MILLIGRAM(S): at 09:58

## 2017-09-12 RX ADMIN — APIXABAN 5 MILLIGRAM(S): 2.5 TABLET, FILM COATED ORAL at 17:50

## 2017-09-12 RX ADMIN — Medication 0.5 MILLIGRAM(S): at 20:19

## 2017-09-12 RX ADMIN — LISINOPRIL 40 MILLIGRAM(S): 2.5 TABLET ORAL at 05:48

## 2017-09-12 RX ADMIN — Medication 1 DROP(S): at 17:50

## 2017-09-12 RX ADMIN — MONTELUKAST 10 MILLIGRAM(S): 4 TABLET, CHEWABLE ORAL at 21:01

## 2017-09-12 RX ADMIN — APIXABAN 5 MILLIGRAM(S): 2.5 TABLET, FILM COATED ORAL at 05:46

## 2017-09-12 RX ADMIN — Medication 150 MILLIGRAM(S): at 17:50

## 2017-09-12 RX ADMIN — Medication 200 MILLIGRAM(S): at 20:40

## 2017-09-12 RX ADMIN — Medication 1: at 08:33

## 2017-09-12 RX ADMIN — Medication 40 MILLIEQUIVALENT(S): at 17:50

## 2017-09-12 RX ADMIN — Medication 150 MILLIGRAM(S): at 05:46

## 2017-09-12 RX ADMIN — DORZOLAMIDE HYDROCHLORIDE 1 DROP(S): 20 SOLUTION/ DROPS OPHTHALMIC at 17:51

## 2017-09-12 RX ADMIN — ATORVASTATIN CALCIUM 80 MILLIGRAM(S): 80 TABLET, FILM COATED ORAL at 21:01

## 2017-09-12 RX ADMIN — Medication 1 DROP(S): at 05:46

## 2017-09-12 RX ADMIN — AMLODIPINE BESYLATE 5 MILLIGRAM(S): 2.5 TABLET ORAL at 05:45

## 2017-09-12 RX ADMIN — CEFTRIAXONE 100 GRAM(S): 500 INJECTION, POWDER, FOR SOLUTION INTRAMUSCULAR; INTRAVENOUS at 11:19

## 2017-09-12 RX ADMIN — Medication 81 MILLIGRAM(S): at 11:19

## 2017-09-12 RX ADMIN — LATANOPROST 1 DROP(S): 0.05 SOLUTION/ DROPS OPHTHALMIC; TOPICAL at 21:01

## 2017-09-12 RX ADMIN — Medication 20 MILLIGRAM(S): at 05:46

## 2017-09-12 NOTE — PROGRESS NOTE ADULT - ASSESSMENT
EKG - Afib RVR LVH  Echo 2017- normal LV function   Cath 2016 - Normal LM, LAD LCX RCA, D1 ostial 90%, treated medically     1) Afib RVR - continue eliquis, heart rate under better control but still elevated,  lopressor  150mg q12,  2D echo shows normal LV, D/C cardizem as the heart rate is in 40's to 60's    2) UTI/bacteremia  - proteus bacteremia ceftriaxone    3) Acute on chronic diastolic dysfunction -  switch lasix to PO 20mg qd

## 2017-09-12 NOTE — PROGRESS NOTE ADULT - SUBJECTIVE AND OBJECTIVE BOX
INTERVAL HISTORY: Pt is lying in bed comfortable, seen in am, no chest pain noSOB  	  MEDICATIONS:  aspirin enteric coated 81 milliGRAM(s) Oral daily  lisinopril 40 milliGRAM(s) Oral daily  apixaban 5 milliGRAM(s) Oral every 12 hours  amLODIPine   Tablet 5 milliGRAM(s) Oral daily  metoprolol 150 milliGRAM(s) Oral every 12 hours    cefTRIAXone   IVPB 1 Gram(s) IV Intermittent every 24 hours    buDESOnide   0.5 milliGRAM(s) Respule 0.5 milliGRAM(s) Inhalation two times a day  ALBUTerol/ipratropium for Nebulization 3 milliLiter(s) Nebulizer every 6 hours PRN  guaiFENesin    Syrup 200 milliGRAM(s) Oral every 6 hours PRN  montelukast 10 milliGRAM(s) Oral at bedtime    melatonin 3 milliGRAM(s) Oral at bedtime PRN      atorvastatin 80 milliGRAM(s) Oral at bedtime  insulin lispro (HumaLOG) corrective regimen sliding scale   SubCutaneous three times a day before meals  insulin lispro (HumaLOG) corrective regimen sliding scale   SubCutaneous at bedtime  dextrose Gel 1 Dose(s) Oral once PRN  dextrose 50% Injectable 12.5 Gram(s) IV Push once  dextrose 50% Injectable 25 Gram(s) IV Push once  dextrose 50% Injectable 25 Gram(s) IV Push once  glucagon  Injectable 1 milliGRAM(s) IntraMuscular once PRN    dorzolamide 2% Ophthalmic Solution 1 Drop(s) Both EYES two times a day  pilocarpine 1% Solution 1 Drop(s) Both EYES two times a day  latanoprost 0.005% Ophthalmic Solution 1 Drop(s) Both EYES at bedtime  dextrose 5%. 1000 milliLiter(s) IV Continuous <Continuous>  sodium chloride 0.9%. 1000 milliLiter(s) IV Continuous <Continuous>      PHYSICAL EXAM:  T(C): 36.7 (09-12-17 @ 11:17), Max: 37.1 (09-11-17 @ 19:06)  HR: 88 (09-12-17 @ 17:49) (74 - 90)  BP: 116/76 (09-12-17 @ 17:49) (116/76 - 132/70)  RR: 18 (09-12-17 @ 11:17) (18 - 18)  SpO2: 100% (09-12-17 @ 11:17) (96% - 100%)  Wt(kg): --  I&O's Summary    11 Sep 2017 07:01  -  12 Sep 2017 07:00  --------------------------------------------------------  IN: 1080 mL / OUT: 2 mL / NET: 1078 mL    12 Sep 2017 07:01  -  12 Sep 2017 18:59  --------------------------------------------------------  IN: 908 mL / OUT: 1150 mL / NET: -242 mL          Appearance: Normal	  HEENT:   Normal oral mucosa, PERRL, EOMI	  Cardiovascular: Normal S1 S2, No JVD, No murmurs, No edema  Respiratory: Lungs clear to auscultation	  Gastrointestinal:  Soft, Non-tender, + BS	  Extremities: Normal range of motion, No clubbing, cyanosis or edema                                    10.0   7.97  )-----------( 259      ( 11 Sep 2017 06:15 )             29.4     09-12    134<L>  |  93<L>  |  14  ----------------------------<  138<H>  3.5   |  26  |  1.13    Ca    9.1      12 Sep 2017 06:21  Mg     1.8     09-11      proBNP:   Lipid Profile:   HgA1c:   TSH:

## 2017-09-12 NOTE — PROGRESS NOTE ADULT - SUBJECTIVE AND OBJECTIVE BOX
CC:  Proteus bacteremia, LESLY    SUBJECTIVE / OVERNIGHT EVENTS:  Daughter Clarisa at bedside, feels pt improved.  Pt denies chest pain, SOB, palpitations, dizziness.    MEDICATIONS  (STANDING):  aspirin enteric coated 81 milliGRAM(s) Oral daily  lisinopril 40 milliGRAM(s) Oral daily  atorvastatin 80 milliGRAM(s) Oral at bedtime  dorzolamide 2% Ophthalmic Solution 1 Drop(s) Both EYES two times a day  pilocarpine 1% Solution 1 Drop(s) Both EYES two times a day  apixaban 5 milliGRAM(s) Oral every 12 hours  latanoprost 0.005% Ophthalmic Solution 1 Drop(s) Both EYES at bedtime  amLODIPine   Tablet 5 milliGRAM(s) Oral daily  insulin lispro (HumaLOG) corrective regimen sliding scale   SubCutaneous three times a day before meals  insulin lispro (HumaLOG) corrective regimen sliding scale   SubCutaneous at bedtime  dextrose 5%. 1000 milliLiter(s) (50 mL/Hr) IV Continuous <Continuous>  dextrose 50% Injectable 12.5 Gram(s) IV Push once  dextrose 50% Injectable 25 Gram(s) IV Push once  dextrose 50% Injectable 25 Gram(s) IV Push once  sodium chloride 0.9%. 1000 milliLiter(s) (75 mL/Hr) IV Continuous <Continuous>  buDESOnide   0.5 milliGRAM(s) Respule 0.5 milliGRAM(s) Inhalation two times a day  metoprolol 150 milliGRAM(s) Oral every 12 hours  montelukast 10 milliGRAM(s) Oral at bedtime  furosemide   Injectable 20 milliGRAM(s) IV Push daily  cefTRIAXone   IVPB 1 Gram(s) IV Intermittent every 24 hours  diltiazem    Tablet 30 milliGRAM(s) Oral every 6 hours  potassium chloride    Tablet ER 40 milliEquivalent(s) Oral once    MEDICATIONS  (PRN):  dextrose Gel 1 Dose(s) Oral once PRN Blood Glucose LESS THAN 70 milliGRAM(s)/deciliter  glucagon  Injectable 1 milliGRAM(s) IntraMuscular once PRN Glucose LESS THAN 70 milligrams/deciliter  melatonin 3 milliGRAM(s) Oral at bedtime PRN Insomnia  ALBUTerol/ipratropium for Nebulization 3 milliLiter(s) Nebulizer every 6 hours PRN Shortness of Breath and/or Wheezing  guaiFENesin    Syrup 200 milliGRAM(s) Oral every 6 hours PRN Cough    CAPILLARY BLOOD GLUCOSE  106 (12 Sep 2017 16:14)  148 (12 Sep 2017 11:41)  157 (12 Sep 2017 07:41)  157 (11 Sep 2017 22:07)    Tele LESLY until last evening, episodes of bradycardia since    Vital Signs Last 24 Hrs  T(C): 36.7 (12 Sep 2017 11:17), Max: 37.1 (11 Sep 2017 19:06)  T(F): 98 (12 Sep 2017 11:17), Max: 98.8 (11 Sep 2017 19:06)  HR: 74 (12 Sep 2017 11:17) (74 - 90)  BP: 121/78 (12 Sep 2017 11:17) (119/57 - 132/70)  RR: 18 (12 Sep 2017 11:17) (18 - 18)  SpO2: 100% (12 Sep 2017 11:17) (96% - 100%)    PHYSICAL EXAM:  GENERAL: NAD, well-developed  HEAD:  Atraumatic, Normocephalic  EYES: EOMI, PERRLA, conjunctiva and sclera clear  NECK: Supple, No JVD  CHEST/LUNG: Clear to auscultation bilaterally; No wheeze  HEART: irreg, irreg  ABDOMEN: Soft, Nontender, Nondistended; Bowel sounds present  EXTREMITIES:  2+ Peripheral Pulses, No clubbing, cyanosis, or edema  PSYCH: AAOx3  NEUROLOGY: non-focal  SKIN: No rashes or lesions    LABS:             10.0   7.97  )-----------( 259      ( 11 Sep 2017 06:15 )             29.4     134<L>  |  93<L>  |  14  ----------------------------<  138<H>  3.5   |  26  |  1.13    Ca    9.1      12 Sep 2017 06:21  Mg     1.8     09-11    RADIOLOGY & ADDITIONAL TESTS:    Imaging Personally Reviewed:    Consultant(s) Notes Reviewed:      Care Discussed with Consultants/Other Providers:

## 2017-09-12 NOTE — PROGRESS NOTE ADULT - SUBJECTIVE AND OBJECTIVE BOX
Dr. Ramirez (Nephrology)  Office [phone]  Cell [phone]  [place] [facility]  Cell [phone]      Patient is a 82y old  Female who presents with a chief complaint of SOB and confusion. Unable to walk (07 Sep 2017 10:36)      Patient seen and examined at bedside. No chest pain/sob    VITALS:  T(F): 98 (09-12-17 @ 11:17), Max: 98.8 (09-11-17 @ 19:06)  HR: 74 (09-12-17 @ 11:17)  BP: 121/78 (09-12-17 @ 11:17)  RR: 18 (09-12-17 @ 11:17)  SpO2: 100% (09-12-17 @ 11:17)  Wt(kg): --    09-11 @ 07:01  -  09-12 @ 07:00  --------------------------------------------------------  IN: 1080 mL / OUT: 2 mL / NET: 1078 mL    09-12 @ 07:01  -  09-12 @ 12:07  --------------------------------------------------------  IN: 436 mL / OUT: 300 mL / NET: 136 mL          PHYSICAL EXAM:  Constitutional: NAD  Neck: No JVD  Respiratory: CTAB, no wheezes, rales or rhonchi  Cardiovascular: S1, S2, RRR  Gastrointestinal: BS+, soft, NT/ND  Extremities: No peripheral edema    Hospital Medications:   MEDICATIONS  (STANDING):  aspirin enteric coated 81 milliGRAM(s) Oral daily  lisinopril 40 milliGRAM(s) Oral daily  atorvastatin 80 milliGRAM(s) Oral at bedtime  dorzolamide 2% Ophthalmic Solution 1 Drop(s) Both EYES two times a day  pilocarpine 1% Solution 1 Drop(s) Both EYES two times a day  apixaban 5 milliGRAM(s) Oral every 12 hours  latanoprost 0.005% Ophthalmic Solution 1 Drop(s) Both EYES at bedtime  amLODIPine   Tablet 5 milliGRAM(s) Oral daily  insulin lispro (HumaLOG) corrective regimen sliding scale   SubCutaneous three times a day before meals  insulin lispro (HumaLOG) corrective regimen sliding scale   SubCutaneous at bedtime  dextrose 5%. 1000 milliLiter(s) (50 mL/Hr) IV Continuous <Continuous>  dextrose 50% Injectable 12.5 Gram(s) IV Push once  dextrose 50% Injectable 25 Gram(s) IV Push once  dextrose 50% Injectable 25 Gram(s) IV Push once  sodium chloride 0.9%. 1000 milliLiter(s) (75 mL/Hr) IV Continuous <Continuous>  buDESOnide   0.5 milliGRAM(s) Respule 0.5 milliGRAM(s) Inhalation two times a day  metoprolol 150 milliGRAM(s) Oral every 12 hours  montelukast 10 milliGRAM(s) Oral at bedtime  furosemide   Injectable 20 milliGRAM(s) IV Push daily  cefTRIAXone   IVPB 1 Gram(s) IV Intermittent every 24 hours  diltiazem    Tablet 30 milliGRAM(s) Oral every 6 hours      LABS:  09-12    134<L>  |  93<L>  |  14  ----------------------------<  138<H>  3.5   |  26  |  1.13    Ca    9.1      12 Sep 2017 06:21  Mg     1.8     09-11      Creatinine Trend: 1.13 <--, 1.11 <--, 1.02 <--, 1.00 <--, 1.02 <--, 0.92 <--, 0.85 <--, 0.92 <--, 0.69 <--, 1.08 <--                                10.0   7.97  )-----------( 259      ( 11 Sep 2017 06:15 )             29.4     Urine Studies:  Urinalysis - [09-07-17 @ 04:20]      Color YELLOW / Appearance CLEAR / SG 1.009 / pH 7.0      Gluc NEGATIVE / Ketone TRACE  / Bili NEGATIVE / Urobili NORMAL       Blood MODERATE / Protein 100 / Leuk Est LARGE / Nitrite NEGATIVE      RBC 5-10 / WBC 10-25 / Hyaline  / Gran  / Sq Epi OCC / Non Sq Epi  / Bacteria FEW    Urine Sodium 81      [09-07-17 @ 23:47]  Urine Osmolality 266      [09-10-17 @ 10:10]    HbA1c 6.9      [09-08-17 @ 06:50]  TSH 0.59      [09-08-17 @ 06:50]  Lipid: chol 74, TG 87, HDL 6, LDL 21      [09-08-17 @ 06:50]        RADIOLOGY & ADDITIONAL STUDIES:

## 2017-09-13 ENCOUNTER — TRANSCRIPTION ENCOUNTER (OUTPATIENT)
Age: 82
End: 2017-09-13

## 2017-09-13 VITALS
SYSTOLIC BLOOD PRESSURE: 139 MMHG | TEMPERATURE: 97 F | DIASTOLIC BLOOD PRESSURE: 59 MMHG | RESPIRATION RATE: 18 BRPM | HEART RATE: 60 BPM | OXYGEN SATURATION: 100 %

## 2017-09-13 LAB
BACTERIA BLD CULT: SIGNIFICANT CHANGE UP
BACTERIA BLD CULT: SIGNIFICANT CHANGE UP
BASOPHILS # BLD AUTO: 0.03 K/UL — SIGNIFICANT CHANGE UP (ref 0–0.2)
BASOPHILS NFR BLD AUTO: 0.3 % — SIGNIFICANT CHANGE UP (ref 0–2)
BUN SERPL-MCNC: 16 MG/DL — SIGNIFICANT CHANGE UP (ref 7–23)
CALCIUM SERPL-MCNC: 9.6 MG/DL — SIGNIFICANT CHANGE UP (ref 8.4–10.5)
CHLORIDE SERPL-SCNC: 99 MMOL/L — SIGNIFICANT CHANGE UP (ref 98–107)
CO2 SERPL-SCNC: 22 MMOL/L — SIGNIFICANT CHANGE UP (ref 22–31)
CREAT SERPL-MCNC: 1.1 MG/DL — SIGNIFICANT CHANGE UP (ref 0.5–1.3)
EOSINOPHIL # BLD AUTO: 0.15 K/UL — SIGNIFICANT CHANGE UP (ref 0–0.5)
EOSINOPHIL NFR BLD AUTO: 1.7 % — SIGNIFICANT CHANGE UP (ref 0–6)
GLUCOSE SERPL-MCNC: 167 MG/DL — HIGH (ref 70–99)
HCT VFR BLD CALC: 31.4 % — LOW (ref 34.5–45)
HGB BLD-MCNC: 10.5 G/DL — LOW (ref 11.5–15.5)
IMM GRANULOCYTES # BLD AUTO: 0.09 # — SIGNIFICANT CHANGE UP
IMM GRANULOCYTES NFR BLD AUTO: 1 % — SIGNIFICANT CHANGE UP (ref 0–1.5)
LYMPHOCYTES # BLD AUTO: 1.71 K/UL — SIGNIFICANT CHANGE UP (ref 1–3.3)
LYMPHOCYTES # BLD AUTO: 18.9 % — SIGNIFICANT CHANGE UP (ref 13–44)
MAGNESIUM SERPL-MCNC: 1.9 MG/DL — SIGNIFICANT CHANGE UP (ref 1.6–2.6)
MCHC RBC-ENTMCNC: 26.2 PG — LOW (ref 27–34)
MCHC RBC-ENTMCNC: 33.4 % — SIGNIFICANT CHANGE UP (ref 32–36)
MCV RBC AUTO: 78.3 FL — LOW (ref 80–100)
MONOCYTES # BLD AUTO: 0.55 K/UL — SIGNIFICANT CHANGE UP (ref 0–0.9)
MONOCYTES NFR BLD AUTO: 6.1 % — SIGNIFICANT CHANGE UP (ref 2–14)
NEUTROPHILS # BLD AUTO: 6.5 K/UL — SIGNIFICANT CHANGE UP (ref 1.8–7.4)
NEUTROPHILS NFR BLD AUTO: 72 % — SIGNIFICANT CHANGE UP (ref 43–77)
NRBC # FLD: 0 — SIGNIFICANT CHANGE UP
PHOSPHATE SERPL-MCNC: 3.5 MG/DL — SIGNIFICANT CHANGE UP (ref 2.5–4.5)
PLATELET # BLD AUTO: 306 K/UL — SIGNIFICANT CHANGE UP (ref 150–400)
PMV BLD: 11 FL — SIGNIFICANT CHANGE UP (ref 7–13)
POTASSIUM SERPL-MCNC: 4.3 MMOL/L — SIGNIFICANT CHANGE UP (ref 3.5–5.3)
POTASSIUM SERPL-SCNC: 4.3 MMOL/L — SIGNIFICANT CHANGE UP (ref 3.5–5.3)
RBC # BLD: 4.01 M/UL — SIGNIFICANT CHANGE UP (ref 3.8–5.2)
RBC # FLD: 17.2 % — HIGH (ref 10.3–14.5)
SODIUM SERPL-SCNC: 135 MMOL/L — SIGNIFICANT CHANGE UP (ref 135–145)
WBC # BLD: 9.03 K/UL — SIGNIFICANT CHANGE UP (ref 3.8–10.5)
WBC # FLD AUTO: 9.03 K/UL — SIGNIFICANT CHANGE UP (ref 3.8–10.5)

## 2017-09-13 PROCEDURE — 99239 HOSP IP/OBS DSCHRG MGMT >30: CPT

## 2017-09-13 RX ORDER — PILOCARPINE HCL 4 %
1 DROPS OPHTHALMIC (EYE)
Qty: 0 | Refills: 0 | COMMUNITY
Start: 2017-09-13

## 2017-09-13 RX ORDER — METOPROLOL TARTRATE 50 MG
2 TABLET ORAL
Qty: 56 | Refills: 0 | OUTPATIENT
Start: 2017-09-13 | End: 2017-09-27

## 2017-09-13 RX ORDER — BUDESONIDE, MICRONIZED 100 %
1 POWDER (GRAM) MISCELLANEOUS
Qty: 1 | Refills: 0 | OUTPATIENT
Start: 2017-09-13 | End: 2017-10-13

## 2017-09-13 RX ORDER — MONTELUKAST 4 MG/1
1 TABLET, CHEWABLE ORAL
Qty: 30 | Refills: 0 | OUTPATIENT
Start: 2017-09-13 | End: 2017-10-13

## 2017-09-13 RX ORDER — ATORVASTATIN CALCIUM 80 MG/1
1 TABLET, FILM COATED ORAL
Qty: 0 | Refills: 0 | COMMUNITY
Start: 2017-09-13

## 2017-09-13 RX ORDER — LISINOPRIL 2.5 MG/1
1 TABLET ORAL
Qty: 0 | Refills: 0 | COMMUNITY
Start: 2017-09-13

## 2017-09-13 RX ORDER — FUROSEMIDE 40 MG
1 TABLET ORAL
Qty: 30 | Refills: 0 | OUTPATIENT
Start: 2017-09-13 | End: 2017-10-13

## 2017-09-13 RX ORDER — AMLODIPINE BESYLATE 2.5 MG/1
1 TABLET ORAL
Qty: 0 | Refills: 0 | COMMUNITY

## 2017-09-13 RX ORDER — ASPIRIN/CALCIUM CARB/MAGNESIUM 324 MG
1 TABLET ORAL
Qty: 0 | Refills: 0 | COMMUNITY
Start: 2017-09-13

## 2017-09-13 RX ORDER — CIPROFLOXACIN LACTATE 400MG/40ML
1 VIAL (ML) INTRAVENOUS
Qty: 6 | Refills: 0 | OUTPATIENT
Start: 2017-09-13 | End: 2017-09-16

## 2017-09-13 RX ORDER — LATANOPROST 0.05 MG/ML
1 SOLUTION/ DROPS OPHTHALMIC; TOPICAL
Qty: 0 | Refills: 0 | COMMUNITY
Start: 2017-09-13

## 2017-09-13 RX ORDER — APIXABAN 2.5 MG/1
1 TABLET, FILM COATED ORAL
Qty: 0 | Refills: 0 | COMMUNITY
Start: 2017-09-13

## 2017-09-13 RX ORDER — METOPROLOL TARTRATE 50 MG
2 TABLET ORAL
Qty: 120 | Refills: 0 | OUTPATIENT
Start: 2017-09-13 | End: 2017-10-13

## 2017-09-13 RX ORDER — AMLODIPINE BESYLATE 2.5 MG/1
1 TABLET ORAL
Qty: 0 | Refills: 0 | COMMUNITY
Start: 2017-09-13

## 2017-09-13 RX ORDER — DORZOLAMIDE HYDROCHLORIDE 20 MG/ML
1 SOLUTION/ DROPS OPHTHALMIC
Qty: 0 | Refills: 0 | COMMUNITY
Start: 2017-09-13

## 2017-09-13 RX ADMIN — DORZOLAMIDE HYDROCHLORIDE 1 DROP(S): 20 SOLUTION/ DROPS OPHTHALMIC at 05:32

## 2017-09-13 RX ADMIN — Medication 200 MILLIGRAM(S): at 05:48

## 2017-09-13 RX ADMIN — Medication 1: at 08:21

## 2017-09-13 RX ADMIN — Medication 150 MILLIGRAM(S): at 05:32

## 2017-09-13 RX ADMIN — CEFTRIAXONE 100 GRAM(S): 500 INJECTION, POWDER, FOR SOLUTION INTRAMUSCULAR; INTRAVENOUS at 11:44

## 2017-09-13 RX ADMIN — Medication 20 MILLIGRAM(S): at 05:36

## 2017-09-13 RX ADMIN — LISINOPRIL 40 MILLIGRAM(S): 2.5 TABLET ORAL at 05:32

## 2017-09-13 RX ADMIN — Medication 0.5 MILLIGRAM(S): at 08:34

## 2017-09-13 RX ADMIN — Medication 1 DROP(S): at 05:32

## 2017-09-13 RX ADMIN — Medication: at 12:30

## 2017-09-13 RX ADMIN — APIXABAN 5 MILLIGRAM(S): 2.5 TABLET, FILM COATED ORAL at 05:32

## 2017-09-13 RX ADMIN — AMLODIPINE BESYLATE 5 MILLIGRAM(S): 2.5 TABLET ORAL at 05:32

## 2017-09-13 RX ADMIN — Medication 81 MILLIGRAM(S): at 11:44

## 2017-09-13 NOTE — DISCHARGE NOTE ADULT - PATIENT PORTAL LINK FT
“You can access the Follow[employer] Patient Portal, offered by [facility], by registering with the following website: [email or web address]”

## 2017-09-13 NOTE — PROGRESS NOTE ADULT - SUBJECTIVE AND OBJECTIVE BOX
CC:  LESLY, CHF    SUBJECTIVE / OVERNIGHT EVENTS:  Doing well.  No chest pain, SOB, palpitations.  Granddaughter at bedside.  Tele no further LESLY or significant kristina    MEDICATIONS  (STANDING):  aspirin enteric coated 81 milliGRAM(s) Oral daily  lisinopril 40 milliGRAM(s) Oral daily  atorvastatin 80 milliGRAM(s) Oral at bedtime  dorzolamide 2% Ophthalmic Solution 1 Drop(s) Both EYES two times a day  pilocarpine 1% Solution 1 Drop(s) Both EYES two times a day  apixaban 5 milliGRAM(s) Oral every 12 hours  latanoprost 0.005% Ophthalmic Solution 1 Drop(s) Both EYES at bedtime  amLODIPine   Tablet 5 milliGRAM(s) Oral daily  insulin lispro (HumaLOG) corrective regimen sliding scale   SubCutaneous three times a day before meals  insulin lispro (HumaLOG) corrective regimen sliding scale   SubCutaneous at bedtime  dextrose 5%. 1000 milliLiter(s) (50 mL/Hr) IV Continuous <Continuous>  dextrose 50% Injectable 12.5 Gram(s) IV Push once  dextrose 50% Injectable 25 Gram(s) IV Push once  dextrose 50% Injectable 25 Gram(s) IV Push once  sodium chloride 0.9%. 1000 milliLiter(s) (75 mL/Hr) IV Continuous <Continuous>  buDESOnide   0.5 milliGRAM(s) Respule 0.5 milliGRAM(s) Inhalation two times a day  metoprolol 150 milliGRAM(s) Oral every 12 hours  montelukast 10 milliGRAM(s) Oral at bedtime  cefTRIAXone   IVPB 1 Gram(s) IV Intermittent every 24 hours  furosemide    Tablet 20 milliGRAM(s) Oral daily    MEDICATIONS  (PRN):  dextrose Gel 1 Dose(s) Oral once PRN Blood Glucose LESS THAN 70 milliGRAM(s)/deciliter  glucagon  Injectable 1 milliGRAM(s) IntraMuscular once PRN Glucose LESS THAN 70 milligrams/deciliter  melatonin 3 milliGRAM(s) Oral at bedtime PRN Insomnia  ALBUTerol/ipratropium for Nebulization 3 milliLiter(s) Nebulizer every 6 hours PRN Shortness of Breath and/or Wheezing  guaiFENesin    Syrup 200 milliGRAM(s) Oral every 6 hours PRN Cough    CAPILLARY BLOOD GLUCOSE  164 (13 Sep 2017 12:15)  155 (13 Sep 2017 08:20)  173 (12 Sep 2017 22:24)  106 (12 Sep 2017 16:14)    Vital Signs Last 24 Hrs  T(C): 37.1 (13 Sep 2017 05:31), Max: 37.2 (12 Sep 2017 22:06)  T(F): 98.8 (13 Sep 2017 05:31), Max: 98.9 (12 Sep 2017 22:06)  HR: 79 (13 Sep 2017 08:34) (55 - 88)  BP: 127/67 (13 Sep 2017 05:31) (115/58 - 127/67)  RR: 18 (13 Sep 2017 05:31) (18 - 18)  SpO2: 99% (13 Sep 2017 08:34) (97% - 100%)    PHYSICAL EXAM:  GENERAL: NAD, well-developed  HEAD:  Atraumatic, Normocephalic  EYES: EOMI, PERRLA, conjunctiva and sclera clear  NECK: Supple, No JVD  CHEST/LUNG: Clear to auscultation bilaterally; No wheeze  HEART: Regular rate and rhythm; No murmurs, rubs, or gallops  ABDOMEN: Soft, Nontender, Nondistended; Bowel sounds present  EXTREMITIES:  2+ Peripheral Pulses, No clubbing, cyanosis, or edema  PSYCH: AAOx3  NEUROLOGY: non-focal  SKIN: No rashes or lesions    LABS:             10.5   9.03  )-----------( 306      ( 13 Sep 2017 06:00 )             31.4     135  |  99  |  16  ----------------------------<  167<H>  4.3   |  22  |  1.10    Ca    9.6      13 Sep 2017 06:00  Phos  3.5     09-13  Mg     1.9     09-13    RADIOLOGY & ADDITIONAL TESTS:    Imaging Personally Reviewed:    Consultant(s) Notes Reviewed:      Care Discussed with Consultants/Other Providers: cardiology re afib, CHF

## 2017-09-13 NOTE — PROGRESS NOTE ADULT - PROBLEM SELECTOR PROBLEM 2
Essential hypertension
Acute on chronic diastolic (congestive) heart failure
Essential hypertension
R/O Coughing
Acute on chronic diastolic (congestive) heart failure
Acute on chronic diastolic (congestive) heart failure
Essential hypertension
Sepsis due to Gram negative bacteria
Atrial fibrillation with RVR
Essential hypertension

## 2017-09-13 NOTE — DISCHARGE NOTE ADULT - HOME CARE AGENCY
[facility] [phone].  Nurse to visit on the day after discharge.  Other appropriate services to be arranged thereafter.

## 2017-09-13 NOTE — PROGRESS NOTE ADULT - ATTENDING COMMENTS
Pt stable for d/c home.  D/w pt/family. Pt stable for d/c home.  D/w pt/family.   Spent 35 minutes counseling and coordinating discharge care.

## 2017-09-13 NOTE — PROGRESS NOTE ADULT - PROBLEM SELECTOR PROBLEM 3
Hypomagnesemia
Atrial fibrillation with RVR
Hypomagnesemia
Sepsis due to Gram negative bacteria
Hypomagnesemia
R/O Coughing
Sepsis due to Gram negative bacteria
Sepsis due to Gram negative bacteria
Hypomagnesemia
Hyponatremia

## 2017-09-13 NOTE — PROGRESS NOTE ADULT - PROBLEM SELECTOR PROBLEM 7
Essential hypertension
Coronary artery disease involving native coronary artery of native heart without angina pectoris
Essential hypertension
Glaucoma of both eyes, unspecified glaucoma type

## 2017-09-13 NOTE — PROGRESS NOTE ADULT - PROBLEM SELECTOR PLAN 1
GNR Bacteremia and GNR UTI  F/U CX speciation  c/w IV zosyn  Monitor Temp,HR,BP  Check Surv Cx
Meto 150 bid, was still rapid so cardizem added, now trending low --> will d/w cards, ?d/c Cardizem  C/w Eliquis
etiology? TSH and serum cortisol is normal  possible SIADH vs CHF. fluid restriction <1L/day. patient educated about not to drink too much water.   Monitor Na level daily
(+) low grade fever, CT of chest 2 days ago showed b/l small effusion, h/o a-fib, clinical picture more suggestive of volume overload and HF.  -Lasix 40mg IVP X1  -Check pro-BNP  Stric I&O's  -Stat Chest X-Ray-_2D echo  -F/U with Cardiology
Meto 150 bid, was still rapid so cardizem added, now trending low --> will d/w cards, ?d/c Cardizem --> rate acceptable off Cardizem  C/w Eliquis
etiology? TSH and serum cortisol is normal  possible SIADH. improving.   Monitor Na level daily
still rapid - d/w cards cont meto, add Cardizem for better rate control  C/w Eliquis
GNR Bacteremia and GNR UTI  F/U CX speciation  c/w IV zosyn  Monitor Temp,HR,BP  Check Surv Cx
etiology? TSH and serum cortisol is normal  possible SIADH vs CHF. fluid restriction <1L/day. patient educated about not to drink too much water.   Monitor Na level daily
etiology? TSH and serum cortisol is normal  r/o SIADH  Get urine osmolality, Urine Na  Monitor Na level daily

## 2017-09-13 NOTE — DISCHARGE NOTE ADULT - SECONDARY DIAGNOSIS.
Atrial fibrillation with RVR Essential hypertension Glaucoma Coughing Acute on chronic diastolic (congestive) heart failure

## 2017-09-13 NOTE — DISCHARGE NOTE ADULT - PLAN OF CARE
resolving Continue antibiotics for 3 more days. Follow up with your PCP Dr. Salazar for further evaluation and management. Please call to make an appointment within 1-2 weeks of discharge. controlled Medication  were changed. Follow up with Dr. Kerr for further evaluation and management. Please call to make an appointment within 1-2 weeks of discharge. Continue medications. Follow up with your PCP Dr. Salazar or Dr. Kerr  for further evaluation and management. Please call to make an appointment within 1-2 weeks of discharge. Continue medications. Follow up with your PCP Dr. Salazar for further evaluation and management. Please call to make an appointment within 1-2 weeks of discharge. You were given IV lasix (medicine to make you urinate) while in the hospital.  When you go home, you will continue to take it in pill form.  Make sure to follow up with Dr. Kerr to determine if you need to continue it.

## 2017-09-13 NOTE — DISCHARGE NOTE ADULT - HOSPITAL COURSE
Patient is an 82yoF with h/o CAD with ischemic cardiomyopathy (EF 52%), DM, Afib, HTN, HLD, DM with glaucoma presents with exertional dyspnea, found to have GNR bacteremia secondary to GNR UTI, Afib with RVR and hyponatremia. Improving. (+) cough. Pt developed low grade fever several days ago with wheezes diffusely, most likely due to volume overload/HF.    Atrial fibrillation with RVR.    C/w Eliquis.   Metoprolol 150mg bid    Acute on chronic diastolic (congestive) heart failure.   CT of chest 2 days ago showed b/l small effusion, h/o a-fib, BNP elevated, clinical picture more suggestive of volume overload and HF.  s/p Lasix 40mg IVP X1 --> lasix 20 IV qd, patient d/c'd on lasix 20mg daily    Sepsis due to Gram negative bacteria.    Proteus bacteremia d/t UTI - sensitive to CTX, d/c'd on cipro 500mg bid x 3 more days    Hyponatremia.   TSH, Cortisol WNL  on lasix, Na has stabilized.     Type 2 diabetes mellitus without complication, unspecified long term insulin use status.   c/w metformin on discharge      CAD   c/w ASA  On BBlocker and ACE INH.    Essential hypertension.  BP acceptable on current meds, norvasc and lisinopril     Stable for discharge home with home PT  patient to follow up with PCP and Dr. Kerr

## 2017-09-13 NOTE — PROGRESS NOTE ADULT - PROBLEM SELECTOR PLAN 2
CT of chest 2 days ago showed b/l small effusion, h/o a-fib, BNP elevated, clinical picture more suggestive of volume overload and HF.  s/p Lasix 40mg IVP X1 --> lasix 20 IV qd, f/u cards recs ?d/c lasix, ?transition to PO
Etiology not clear, ? due her h/o CHF vs bronchitis vs Viral infection. No SOB or chest pain.  Continue to monitor  Robitussin PRN
controlled
CT of chest 2 days ago showed b/l small effusion, h/o a-fib, BNP elevated, clinical picture more suggestive of volume overload and HF.  s/p Lasix 40mg IVP X1 --> lasix 20 IV qd --> 20 PO qd, f/u in office with Dr. Kerr
CT of chest 2 days ago showed b/l small effusion, h/o a-fib, BNP elevated, clinical picture more suggestive of volume overload and HF.  s/p Lasix 40mg IVP X1 --> lasix 20 IV qd, f/u cards recs
GNR Bacteremia and GNR UTI  F/U CX speciation  c/w IV zosyn  Monitor Temp,HR,BP  Check Surv Cx
controlled
On tele, cards following  TTE pending  C/w Eliquis  C/w Metoprolol
controlled
controlled

## 2017-09-13 NOTE — PROGRESS NOTE ADULT - PROBLEM SELECTOR PROBLEM 8
Need for prophylactic measure
Essential hypertension
Glaucoma of both eyes, unspecified glaucoma type
Need for prophylactic measure
Need for prophylactic measure
Hypokalemia

## 2017-09-13 NOTE — PROGRESS NOTE ADULT - PROVIDER SPECIALTY LIST ADULT
Cardiology
Hospitalist
Internal Medicine
Nephrology
Pulmonology
Pulmonology
Nephrology
Hospitalist
Nephrology
Hospitalist

## 2017-09-13 NOTE — DISCHARGE NOTE ADULT - MEDICATION SUMMARY - MEDICATIONS TO STOP TAKING
I will STOP taking the medications listed below when I get home from the hospital:    meclizine 25 mg oral tablet  -- 1 tab(s) by mouth 3 times a day, As Needed

## 2017-09-13 NOTE — PROGRESS NOTE ADULT - PROBLEM SELECTOR PROBLEM 6
Coronary artery disease involving native coronary artery of native heart without angina pectoris
Hematuria
Type 2 diabetes mellitus without complication, unspecified long term insulin use status
Essential hypertension
Hematuria

## 2017-09-13 NOTE — DISCHARGE NOTE ADULT - ADDITIONAL INSTRUCTIONS
Lasix dose changed to 20mg daily  Metoprolol dose changed to 150mg twice a day.   Follow up with Dr. Salazar and Dr. Kerr

## 2017-09-13 NOTE — PROGRESS NOTE ADULT - PROBLEM SELECTOR PLAN 3
Better
On tele, cards following  TTE pending  C/w Eliquis  C/w Metoprolol
Proteus bacteremia d/t UTI - sensitive to CTX, change;  likely PO fluoroquinolone on d/c
Better
Etiology not clear, ? due her h/o CHF vs bronchitis vs Viral infection. No SOB or chest pain.  Continue to monitor  Robitussin PRN
Proteus bacteremia d/t UTI - sensitive to CTX, change --> complete 10 days total with PO cipro
Proteus bacteremia d/t UTI - sensitive to CTX, change;  likely PO fluoroquinolone on d/c
Check Urine OSM  TSH, AM Cortisol WNL  Patient was hypovolemic initially - suspect hypovolemic hypotonic hyponatremia 2/2 insensible losses from sepsis +/- component of SIADH
Better
Better

## 2017-09-13 NOTE — PROGRESS NOTE ADULT - PROBLEM SELECTOR PLAN 8
VTE PPx - on eliquis  d/w daughter bedside
BP acceptable on metoprolol, amlodipine and lisinopril
VTE PPx - on eliquis  d/w daughter bedside
VTE PPx - on eliquis  d/w daughter bedside
c/w trusopt, xalatan and pilocarpine gtts
Hypokalemia - Replete with KCl and monitor.

## 2017-09-13 NOTE — PROGRESS NOTE ADULT - PROBLEM SELECTOR PROBLEM 1
Atrial fibrillation with RVR
Hyponatremia
Sepsis due to Gram negative bacteria
Atrial fibrillation with RVR
Atrial fibrillation with RVR
Hyponatremia
Hyponatremia
R/O Wheezing
Hyponatremia
Sepsis due to Gram negative bacteria

## 2017-09-13 NOTE — PROGRESS NOTE ADULT - NSHPATTENDINGPLANDISCUSS_GEN_ALL_CORE
Dr Woodson
Dr Woodson
NP
np
NP, cardiology

## 2017-09-13 NOTE — PROGRESS NOTE ADULT - SUBJECTIVE AND OBJECTIVE BOX
Dr. Ramirez (Nephrology)  Office [phone]  Cell [phone]  [place] [facility]  Cell [phone]      Patient is a 82y old  Female who presents with a chief complaint of SOB and confusion. Unable to walk (13 Sep 2017 10:38)      Patient seen and examined at bedside. No chest pain/sob    VITALS:  T(F): 98.8 (09-13-17 @ 05:31), Max: 98.9 (09-12-17 @ 22:06)  HR: 79 (09-13-17 @ 08:34)  BP: 127/67 (09-13-17 @ 05:31)  RR: 18 (09-13-17 @ 05:31)  SpO2: 99% (09-13-17 @ 08:34)  Wt(kg): --    09-12 @ 07:01  -  09-13 @ 07:00  --------------------------------------------------------  IN: 1158 mL / OUT: 2110 mL / NET: -952 mL    09-13 @ 07:01  -  09-13 @ 11:43  --------------------------------------------------------  IN: 386 mL / OUT: 400 mL / NET: -14 mL          PHYSICAL EXAM:  Constitutional: NAD  Neck: No JVD  Respiratory: CTAB, no wheezes, rales or rhonchi  Cardiovascular: S1, S2, RRR  Gastrointestinal: BS+, soft, NT/ND  Extremities: No peripheral edema    Hospital Medications:   MEDICATIONS  (STANDING):  aspirin enteric coated 81 milliGRAM(s) Oral daily  lisinopril 40 milliGRAM(s) Oral daily  atorvastatin 80 milliGRAM(s) Oral at bedtime  dorzolamide 2% Ophthalmic Solution 1 Drop(s) Both EYES two times a day  pilocarpine 1% Solution 1 Drop(s) Both EYES two times a day  apixaban 5 milliGRAM(s) Oral every 12 hours  latanoprost 0.005% Ophthalmic Solution 1 Drop(s) Both EYES at bedtime  amLODIPine   Tablet 5 milliGRAM(s) Oral daily  insulin lispro (HumaLOG) corrective regimen sliding scale   SubCutaneous three times a day before meals  insulin lispro (HumaLOG) corrective regimen sliding scale   SubCutaneous at bedtime  dextrose 5%. 1000 milliLiter(s) (50 mL/Hr) IV Continuous <Continuous>  dextrose 50% Injectable 12.5 Gram(s) IV Push once  dextrose 50% Injectable 25 Gram(s) IV Push once  dextrose 50% Injectable 25 Gram(s) IV Push once  sodium chloride 0.9%. 1000 milliLiter(s) (75 mL/Hr) IV Continuous <Continuous>  buDESOnide   0.5 milliGRAM(s) Respule 0.5 milliGRAM(s) Inhalation two times a day  metoprolol 150 milliGRAM(s) Oral every 12 hours  montelukast 10 milliGRAM(s) Oral at bedtime  cefTRIAXone   IVPB 1 Gram(s) IV Intermittent every 24 hours  furosemide    Tablet 20 milliGRAM(s) Oral daily      LABS:  09-13    135  |  99  |  16  ----------------------------<  167<H>  4.3   |  22  |  1.10    Ca    9.6      13 Sep 2017 06:00  Phos  3.5     09-13  Mg     1.9     09-13      Creatinine Trend: 1.10 <--, 1.13 <--, 1.11 <--, 1.02 <--, 1.00 <--, 1.02 <--, 0.92 <--, 0.85 <--, 0.92 <--, 0.69 <--, 1.08 <--    Phosphorus Level, Serum: 3.5 mg/dL (09-13 @ 06:00)                              10.5   9.03  )-----------( 306      ( 13 Sep 2017 06:00 )             31.4     Urine Studies:  Urinalysis - [09-07-17 @ 04:20]      Color YELLOW / Appearance CLEAR / SG 1.009 / pH 7.0      Gluc NEGATIVE / Ketone TRACE  / Bili NEGATIVE / Urobili NORMAL       Blood MODERATE / Protein 100 / Leuk Est LARGE / Nitrite NEGATIVE      RBC 5-10 / WBC 10-25 / Hyaline  / Gran  / Sq Epi OCC / Non Sq Epi  / Bacteria FEW    Urine Sodium 81      [09-07-17 @ 23:47]  Urine Osmolality 266      [09-10-17 @ 10:10]    HbA1c 6.9      [09-08-17 @ 06:50]  TSH 0.59      [09-08-17 @ 06:50]  Lipid: chol 74, TG 87, HDL 6, LDL 21      [09-08-17 @ 06:50]        RADIOLOGY & ADDITIONAL STUDIES: Dr. Ramirez (Nephrology)  Office [phone]  Cell [phone]  [place] [facility]  Cell [phone]      Patient is a 82y old  Female who presents with a chief complaint of SOB and confusion. Unable to walk (13 Sep 2017 10:38)      Patient seen and examined at bedside. No chest pain/sob    VITALS:  T(F): 98.8 (09-13-17 @ 05:31), Max: 98.9 (09-12-17 @ 22:06)  HR: 79 (09-13-17 @ 08:34)  BP: 127/67 (09-13-17 @ 05:31)  RR: 18 (09-13-17 @ 05:31)  SpO2: 99% (09-13-17 @ 08:34)  Wt(kg): --    09-12 @ 07:01  -  09-13 @ 07:00  --------------------------------------------------------  IN: 1158 mL / OUT: 2110 mL / NET: -952 mL    09-13 @ 07:01  -  09-13 @ 11:43  --------------------------------------------------------  IN: 386 mL / OUT: 400 mL / NET: -14 mL          PHYSICAL EXAM:  Constitutional: NAD  Neck: No JVD  Respiratory: CTAB, no wheezes, rales or rhonchi  Cardiovascular: S1, S2, RRR  Gastrointestinal: BS+, soft, NT/ND  Extremities: No peripheral edema    Hospital Medications:   MEDICATIONS  (STANDING):  aspirin enteric coated 81 milliGRAM(s) Oral daily  lisinopril 40 milliGRAM(s) Oral daily  atorvastatin 80 milliGRAM(s) Oral at bedtime  dorzolamide 2% Ophthalmic Solution 1 Drop(s) Both EYES two times a day  pilocarpine 1% Solution 1 Drop(s) Both EYES two times a day  apixaban 5 milliGRAM(s) Oral every 12 hours  latanoprost 0.005% Ophthalmic Solution 1 Drop(s) Both EYES at bedtime  amLODIPine   Tablet 5 milliGRAM(s) Oral daily  insulin lispro (HumaLOG) corrective regimen sliding scale   SubCutaneous three times a day before meals  insulin lispro (HumaLOG) corrective regimen sliding scale   SubCutaneous at bedtime  dextrose 5%. 1000 milliLiter(s) (50 mL/Hr) IV Continuous <Continuous>  dextrose 50% Injectable 12.5 Gram(s) IV Push once  dextrose 50% Injectable 25 Gram(s) IV Push once  dextrose 50% Injectable 25 Gram(s) IV Push once  sodium chloride 0.9%. 1000 milliLiter(s) (75 mL/Hr) IV Continuous <Continuous>  buDESOnide   0.5 milliGRAM(s) Respule 0.5 milliGRAM(s) Inhalation two times a day  metoprolol 150 milliGRAM(s) Oral every 12 hours  montelukast 10 milliGRAM(s) Oral at bedtime  cefTRIAXone   IVPB 1 Gram(s) IV Intermittent every 24 hours  furosemide    Tablet 20 milliGRAM(s) Oral daily      LABS:  09-13    135  |  99  |  16  ----------------------------<  167<H>  4.3   |  22  |  1.10    Ca    9.6      13 Sep 2017 06:00  Phos  3.5     09-13  Mg     1.9     09-13      Creatinine Trend: 1.10 <--, 1.13 <--, 1.11 <--, 1.02 <--, 1.00 <--, 1.02 <--, 0.92 <--, 0.85 <--, 0.92 <--, 0.69 <--, 1.08 <--    Phosphorus Level, Serum: 3.5 mg/dL (09-13 @ 06:00)                              10.5   9.03  )-----------( 306      ( 13 Sep 2017 06:00 )             31.4     Urine Studies:  Urinalysis - [09-07-17 @ 04:20]      Color YELLOW / Appearance CLEAR / SG 1.009 / pH 7.0      Gluc NEGATIVE / Ketone TRACE  / Bili NEGATIVE / Urobili NORMAL       Blood MODERATE / Protein 100 / Leuk Est LARGE / Nitrite NEGATIVE      RBC 5-10 / WBC 10-25 / Hyaline  / Gran  / Sq Epi OCC / Non Sq Epi  / Bacteria FEW    Urine Sodium 81      [09-07-17 @ 23:47]  Urine Osmolality 266      [09-10-17 @ 10:10]    HbA1c 6.9      [09-08-17 @ 06:50]  TSH 0.59      [09-08-17 @ 06:50]  Lipid: chol 74, TG 87, HDL 6, LDL 21      [09-08-17 @ 06:50]        RADIOLOGY & ADDITIONAL STUDIES:

## 2017-09-13 NOTE — PROGRESS NOTE ADULT - PROBLEM SELECTOR PLAN 6
c/w ASA  On BBlocker and ACE INH
ON ISS  Monitoring FSs
c/w ASA  On BBlocker and ACE INH
repeat UA before further work up
BP acceptable on metoprolol, amlodipine and lisinopril
repeat UA before further work up

## 2017-09-13 NOTE — PROGRESS NOTE ADULT - ASSESSMENT
82F CAD with ischemic cardiomyopathy (EF 52%), DM, Afib, HTN, HLD, DM with glaucoma presents with exertional dyspnea, found to have GNR bacteremia secondary to GNR UTI, Afib with RVR and hyponatremia. Improving. (+) cough. Pt developed low grade fever several days ago with wheezes diffusely, most likely due to volume overload/HF.  Clinically improved.

## 2017-09-13 NOTE — DISCHARGE NOTE ADULT - CARE PLAN
Principal Discharge DX:	Sepsis due to urinary tract infection  Goal:	resolving  Instructions for follow-up, activity and diet:	Continue antibiotics for 3 more days. Follow up with your PCP Dr. Salazar for further evaluation and management. Please call to make an appointment within 1-2 weeks of discharge.  Secondary Diagnosis:	Atrial fibrillation with RVR  Goal:	controlled  Instructions for follow-up, activity and diet:	Medication  were changed. Follow up with Dr. Kerr for further evaluation and management. Please call to make an appointment within 1-2 weeks of discharge.  Secondary Diagnosis:	Essential hypertension  Instructions for follow-up, activity and diet:	Continue medications. Follow up with your PCP Dr. Salazar or Dr. Kerr  for further evaluation and management. Please call to make an appointment within 1-2 weeks of discharge.  Secondary Diagnosis:	Glaucoma  Instructions for follow-up, activity and diet:	Continue medications. Follow up with your PCP Dr. Salazar for further evaluation and management. Please call to make an appointment within 1-2 weeks of discharge.  Secondary Diagnosis:	Coughing  Instructions for follow-up, activity and diet:	Continue medications. Follow up with your PCP Dr. Salazar for further evaluation and management. Please call to make an appointment within 1-2 weeks of discharge. Principal Discharge DX:	Sepsis due to urinary tract infection  Goal:	resolving  Instructions for follow-up, activity and diet:	Continue antibiotics for 3 more days. Follow up with your PCP Dr. Salazar for further evaluation and management. Please call to make an appointment within 1-2 weeks of discharge.  Secondary Diagnosis:	Atrial fibrillation with RVR  Goal:	controlled  Instructions for follow-up, activity and diet:	Medication  were changed. Follow up with Dr. Kerr for further evaluation and management. Please call to make an appointment within 1-2 weeks of discharge.  Secondary Diagnosis:	Essential hypertension  Instructions for follow-up, activity and diet:	Continue medications. Follow up with your PCP Dr. Salazar or Dr. Kerr  for further evaluation and management. Please call to make an appointment within 1-2 weeks of discharge.  Secondary Diagnosis:	Glaucoma  Instructions for follow-up, activity and diet:	Continue medications. Follow up with your PCP Dr. Salazar for further evaluation and management. Please call to make an appointment within 1-2 weeks of discharge.  Secondary Diagnosis:	Coughing  Instructions for follow-up, activity and diet:	Continue medications. Follow up with your PCP Dr. Salazar for further evaluation and management. Please call to make an appointment within 1-2 weeks of discharge.  Secondary Diagnosis:	Acute on chronic diastolic (congestive) heart failure  Instructions for follow-up, activity and diet:	You were given IV lasix (medicine to make you urinate) while in the hospital.  When you go home, you will continue to take it in pill form.  Make sure to follow up with Dr. Kerr to determine if you need to continue it.

## 2017-09-13 NOTE — PROGRESS NOTE ADULT - SUBJECTIVE AND OBJECTIVE BOX
INTERVAL HISTORY: pt is lying in bed comfortable, not in distress, no cp no SOB  	  MEDICATIONS:  aspirin enteric coated 81 milliGRAM(s) Oral daily  lisinopril 40 milliGRAM(s) Oral daily  apixaban 5 milliGRAM(s) Oral every 12 hours  amLODIPine   Tablet 5 milliGRAM(s) Oral daily  metoprolol 150 milliGRAM(s) Oral every 12 hours  furosemide    Tablet 20 milliGRAM(s) Oral daily    cefTRIAXone   IVPB 1 Gram(s) IV Intermittent every 24 hours    buDESOnide   0.5 milliGRAM(s) Respule 0.5 milliGRAM(s) Inhalation two times a day  ALBUTerol/ipratropium for Nebulization 3 milliLiter(s) Nebulizer every 6 hours PRN  guaiFENesin    Syrup 200 milliGRAM(s) Oral every 6 hours PRN  montelukast 10 milliGRAM(s) Oral at bedtime    melatonin 3 milliGRAM(s) Oral at bedtime PRN      atorvastatin 80 milliGRAM(s) Oral at bedtime  insulin lispro (HumaLOG) corrective regimen sliding scale   SubCutaneous three times a day before meals  insulin lispro (HumaLOG) corrective regimen sliding scale   SubCutaneous at bedtime  dextrose Gel 1 Dose(s) Oral once PRN  dextrose 50% Injectable 12.5 Gram(s) IV Push once  dextrose 50% Injectable 25 Gram(s) IV Push once  dextrose 50% Injectable 25 Gram(s) IV Push once  glucagon  Injectable 1 milliGRAM(s) IntraMuscular once PRN    dorzolamide 2% Ophthalmic Solution 1 Drop(s) Both EYES two times a day  pilocarpine 1% Solution 1 Drop(s) Both EYES two times a day  latanoprost 0.005% Ophthalmic Solution 1 Drop(s) Both EYES at bedtime  dextrose 5%. 1000 milliLiter(s) IV Continuous <Continuous>  sodium chloride 0.9%. 1000 milliLiter(s) IV Continuous <Continuous>      PHYSICAL EXAM:  T(C): 37.1 (09-13-17 @ 05:31), Max: 37.2 (09-12-17 @ 22:06)  HR: 79 (09-13-17 @ 08:34) (55 - 88)  BP: 127/67 (09-13-17 @ 05:31) (115/58 - 127/67)  RR: 18 (09-13-17 @ 05:31) (18 - 18)  SpO2: 99% (09-13-17 @ 08:34) (97% - 100%)  Wt(kg): --  I&O's Summary    12 Sep 2017 07:01  -  13 Sep 2017 07:00  --------------------------------------------------------  IN: 1158 mL / OUT: 2110 mL / NET: -952 mL    13 Sep 2017 07:01  -  13 Sep 2017 12:57  --------------------------------------------------------  IN: 436 mL / OUT: 400 mL / NET: 36 mL          Appearance: Normal	  HEENT:   Normal oral mucosa, PERRL, EOMI	  Cardiovascular: Normal S1 S2, No JVD, No murmurs, No edema, irregularly irregular  Respiratory: Lungs clear to auscultation	  Gastrointestinal:  Soft, Non-tender, + BS	  Extremities: Normal range of motion, No clubbing, cyanosis or edema                                    10.5   9.03  )-----------( 306      ( 13 Sep 2017 06:00 )             31.4     09-13    135  |  99  |  16  ----------------------------<  167<H>  4.3   |  22  |  1.10    Ca    9.6      13 Sep 2017 06:00  Phos  3.5     09-13  Mg     1.9     09-13      proBNP:   Lipid Profile:   HgA1c:   TSH:

## 2017-09-13 NOTE — DISCHARGE NOTE ADULT - MEDICATION SUMMARY - MEDICATIONS TO TAKE
I will START or STAY ON the medications listed below when I get home from the hospital:    budesonide 0.5 mg/2 mL inhalation suspension  -- 1 puff(s) inhaled 2 times a day   -- Indication: For Coughing    aspirin 81 mg oral delayed release tablet  -- 1 tab(s) by mouth once a day  -- Indication: For CAD (coronary artery disease)    lisinopril 40 mg oral tablet  -- 1 tab(s) by mouth once a day  -- Indication: For Essential hypertension    apixaban 5 mg oral tablet  -- 1 tab(s) by mouth every 12 hours  -- Indication: For Atrial fibrillation with rapid ventricular response    metFORMIN 500 mg oral tablet  -- 1 tab(s) by mouth 2 times a day  -- Indication: For Type 2 diabetes mellitus without complication, unspecified long term insulin use status    atorvastatin 80 mg oral tablet  -- 1 tab(s) by mouth once a day (at bedtime)  -- Indication: For CAD (coronary artery disease)    metoprolol tartrate 75 mg oral tablet  -- 2 tab(s) by mouth every 12 hours  -- Indication: For Atrial fibrillation with rapid ventricular response    amLODIPine 5 mg oral tablet  -- 1 tab(s) by mouth once a day  -- Indication: For Essential hypertension    furosemide 20 mg oral tablet  -- 1 tab(s) by mouth once a day  -- Indication: For Acute on chronic diastolic (congestive) heart failure    montelukast 10 mg oral tablet  -- 1 tab(s) by mouth once a day (at bedtime)  -- Indication: For Coughing    pilocarpine 1% ophthalmic solution  -- 1 drop(s) to each affected eye 2 times a day  -- Indication: For Glaucoma    dorzolamide 2% ophthalmic solution  -- 1 drop(s) to each affected eye 2 times a day  -- Indication: For Glaucoma    latanoprost 0.005% ophthalmic solution  -- 1 drop(s) to each affected eye once a day (at bedtime)  -- Indication: For Glaucoma    ciprofloxacin 500 mg oral tablet  -- 1 tab(s) by mouth 2 times a day   -- Avoid prolonged or excessive exposure to direct and/or artificial sunlight while taking this medication.  Check with your doctor before becoming pregnant.  Do not take dairy products, antacids, or iron preparations within one hour of this medication.  Finish all this medication unless otherwise directed by prescriber.  Medication should be taken with plenty of water.    -- Indication: For UTI (urinary tract infection)

## 2017-09-13 NOTE — DISCHARGE NOTE ADULT - MEDICATION SUMMARY - MEDICATIONS TO CHANGE
I will SWITCH the dose or number of times a day I take the medications listed below when I get home from the hospital:    metoprolol tartrate 75 mg oral tablet  -- 1 tab(s) by mouth 2 times a day    furosemide 40 mg oral tablet  -- 1 tab(s) by mouth once a day  -- Avoid prolonged or excessive exposure to direct and/or artificial sunlight while taking this medication.  It is very important that you take or use this exactly as directed.  Do not skip doses or discontinue unless directed by your doctor.  It may be advisable to drink a full glass orange juice or eat a banana daily while taking this medication.

## 2017-09-13 NOTE — PROGRESS NOTE ADULT - PROBLEM SELECTOR PLAN 5
repeat UA before further work up
ON ISS  Monitoring FSs
Check Urine OSM  TSH, AM Cortisol WNL  Patient was hypovolemic initially - suspect hypovolemic hypotonic hyponatremia 2/2 insensible losses from sepsis +/- component of SIADH
ON ISS  Monitoring FSs
repeat UA before further work up
repeat UA before further work up
c/w ASA  On BBlocker and ACE INH
repeat UA before further work up

## 2017-09-13 NOTE — PROGRESS NOTE ADULT - PROBLEM SELECTOR PLAN 7
BP acceptable on current meds, f/u
BP acceptable on current meds, f/u
BP acceptable on metoprolol, amlodipine and lisinopril
BP acceptable on metoprolol, amlodipine and lisinopril
c/w ASA  On BBlocker and ACE INH
c/w trusopt, xalatan and pilocarpine gtts

## 2017-09-13 NOTE — PROGRESS NOTE ADULT - ASSESSMENT
EKG - Afib RVR LVH  Echo 2017- normal LV function   Cath 2016 - Normal LM, LAD LCX RCA, D1 ostial 90%, treated medically     1) Afib RVR - continue eliquis, heart rate under better control but still elevated,  lopressor  150mg q12,  2D echo shows normal LV, D/C cardizem as the heart rate is in 40's to 60's yeterday, today heart rate improved    2) UTI/bacteremia  - proteus bacteremia ceftriaxone    3) Acute on chronic diastolic dysfunction -  switch lasix to PO 20mg qd    d/c planning

## 2019-01-02 NOTE — ED PROVIDER NOTE - PROGRESS NOTE DETAILS
nonweight-bearing/Bilateral Lower Extremity HERRERA note:  81 y/o F with PMH HTN, DM, HLD, glaucoma, Afib on eliquis / metoprolol p/w weakness for 3 days w/ dysuria. Pt. BIBEMS by family for worsening weakness for 3 days. Pt. states she has been having pain with urination and  generalized weakness. She denies chest pain, N/V, abdominal pain, diarrhea, dysuria. Pt. found to be in afib rvr in ed in setting of fever. Will treat with IVF, Abx for suspected UTI. temperature control if rate >140’s after improvement of her temperature will give metoprolol.  Likely sepsis 2/2 uti, cbc, cmp, blood cult, vbg, ua, urine culture, ekg, trop, ckmb admission w/ tele

## 2020-01-05 ENCOUNTER — TRANSCRIPTION ENCOUNTER (OUTPATIENT)
Age: 85
End: 2020-01-05

## 2020-05-24 NOTE — PROGRESS NOTE ADULT - ASSESSMENT
Patient is an 82yoF with h/o CAD with ischemic cardiomyopathy (EF 52%), DM, Afib, HTN, HLD, DM with glaucoma presents with exertional dyspnea, found to have GNR bacteremia secondary to GNR UTI, Afib with RVR and hyponatremia. Improving. (+) cough ROS: CONTUSIONAL: Denies fever, chills, fatigue, wt loss. HEAD: Denies trauma, HA, Dizziness. EYE: Denies Acute visual changes, diplopia. ENMT: Denies change in hearing, tinnitus, epistaxis, difficulty swallowing, sore throat. CARDIO: Denies CP, palpitations, edema. RESP: Denies Cough, SOB , Diff breathing, hemoptysis. GI: Denies N/V, ABD pain, change in bowel movement. URINARY: Denies difficulty urinating, pelvic pain. MS:  Denies joint pain, back pain, weakness, decreased ROM, swelling. NEURO: Denies change in gait, seizures, loss of sensation, dizziness, confusion LOC.  PSY: NO SI/HI.

## 2021-04-21 NOTE — H&P ADULT - MUSCULOSKELETAL
Patient: Arjun Amaya    Procedure(s):  Bilateral upper lid ptosis repair    Diagnosis:Unspecified ptosis of bilateral eyelids [H02.403]  Diagnosis Additional Information: No value filed.    Anesthesia Type:  MAC    Note:  Disposition: Outpatient   Postop Pain Control: Uneventful            Sign Out: Well controlled pain   PONV: No   Neuro/Psych: Uneventful            Sign Out: Acceptable/Baseline neuro status   Airway/Respiratory: Uneventful            Sign Out: Acceptable/Baseline resp. status   CV/Hemodynamics: Uneventful            Sign Out: Acceptable CV status; No obvious hypovolemia; No obvious fluid overload   Other NRE: NONE   DID A NON-ROUTINE EVENT OCCUR? No           Last vitals:  Vitals:    04/21/21 0819 04/21/21 0955 04/21/21 1013   BP: (!) 122/103 108/50 109/67   Pulse: 103 92 90   Resp: 15 14 16   Temp: 36.7  C (98  F) 36.4  C (97.6  F) 36.3  C (97.4  F)   SpO2: 95% 92% 97%       Last vitals prior to Anesthesia Care Transfer:  CRNA VITALS  4/21/2021 0923 - 4/21/2021 1023      4/21/2021             Resp Rate (set):  10    EKG:  NSR          Electronically Signed By: Kenny Maharaj MD  April 21, 2021  4:39 PM   negative detailed exam normal/no calf tenderness

## 2022-08-29 PROBLEM — I48.91 UNSPECIFIED ATRIAL FIBRILLATION: Chronic | Status: ACTIVE | Noted: 2017-09-07

## 2022-08-29 PROBLEM — E11.9 TYPE 2 DIABETES MELLITUS WITHOUT COMPLICATIONS: Chronic | Status: ACTIVE | Noted: 2017-09-07

## 2022-08-29 PROBLEM — I25.10 ATHEROSCLEROTIC HEART DISEASE OF NATIVE CORONARY ARTERY WITHOUT ANGINA PECTORIS: Chronic | Status: ACTIVE | Noted: 2017-09-07

## 2023-03-29 ENCOUNTER — APPOINTMENT (OUTPATIENT)
Dept: NEUROLOGY | Facility: CLINIC | Age: 88
End: 2023-03-29
Payer: MEDICARE

## 2023-03-29 PROCEDURE — 96133 NRPSYC TST EVAL PHYS/QHP EA: CPT

## 2023-03-29 PROCEDURE — 96138 PSYCL/NRPSYC TECH 1ST: CPT | Mod: NC

## 2023-03-29 PROCEDURE — 96116 NUBHVL XM PHYS/QHP 1ST HR: CPT

## 2023-03-29 PROCEDURE — 96139 PSYCL/NRPSYC TST TECH EA: CPT | Mod: NC

## 2023-03-29 PROCEDURE — 96132 NRPSYC TST EVAL PHYS/QHP 1ST: CPT

## 2023-04-05 ENCOUNTER — APPOINTMENT (OUTPATIENT)
Dept: NEUROLOGY | Facility: CLINIC | Age: 88
End: 2023-04-05

## 2023-06-02 ENCOUNTER — APPOINTMENT (OUTPATIENT)
Dept: NEUROLOGY | Facility: CLINIC | Age: 88
End: 2023-06-02
Payer: MEDICARE

## 2023-06-02 PROCEDURE — 96132 NRPSYC TST EVAL PHYS/QHP 1ST: CPT | Mod: 95

## 2023-06-06 ENCOUNTER — TRANSCRIPTION ENCOUNTER (OUTPATIENT)
Age: 88
End: 2023-06-06

## 2023-06-21 ENCOUNTER — TRANSCRIPTION ENCOUNTER (OUTPATIENT)
Age: 88
End: 2023-06-21

## 2023-07-09 NOTE — ED ADULT NURSE NOTE - NS ED NURSE LEVEL OF CONSCIOUSNESS MENTAL STATUS
Face to Face Supporting Documentation - Home Health    The encounter with this patient was in whole or in part the primary reason for home health admission.    Date of encounter:   Patient:                    MRN:                       YOB: 2021  Lauren Esquivel  [id number]  1947     Home health to see patient for:  Skilled Nursing care for assessment, interventions & education, Physical Therapy evaluation and treatment, and Occupational therapy evaluation and treatment    Skilled need for:  Comment: L1 compression fracture    Skilled nursing interventions to include:  Comment: PTOT    Homebound status evidenced by:  Needs the assistance of another person in order to leave the home. Leaving home requires a considerable and taxing effort. There is a normal inability to leave the home.    Community Physician to provide follow up care: Yessy Paz M.D.     Optional Interventions? No      I certify the face to face encounter for this home health care referral meets the CMS requirements and the encounter/clinical assessment with the patient was, in whole, or in part, for the medical condition(s) listed above, which is the primary reason for home health care. Based on my clinical findings: the service(s) are medically necessary, support the need for home health care, and the homebound criteria are met.  I certify that this patient has had a face to face encounter by myself.  Cristian Klein M.D. - NPI: [id number]   Alert/Awake/Cooperative

## 2023-10-30 ENCOUNTER — APPOINTMENT (OUTPATIENT)
Dept: NEUROLOGY | Facility: CLINIC | Age: 88
End: 2023-10-30
Payer: MEDICARE

## 2023-10-30 VITALS — SYSTOLIC BLOOD PRESSURE: 101 MMHG | HEART RATE: 87 BPM | DIASTOLIC BLOOD PRESSURE: 70 MMHG

## 2023-10-30 DIAGNOSIS — R45.86 EMOTIONAL LABILITY: ICD-10-CM

## 2023-10-30 DIAGNOSIS — R26.9 UNSPECIFIED ABNORMALITIES OF GAIT AND MOBILITY: ICD-10-CM

## 2023-10-30 DIAGNOSIS — I67.9 CEREBROVASCULAR DISEASE, UNSPECIFIED: ICD-10-CM

## 2023-10-30 PROCEDURE — 99215 OFFICE O/P EST HI 40 MIN: CPT

## 2023-10-30 RX ORDER — DILTIAZEM HYDROCHLORIDE 120 MG/1
120 TABLET, EXTENDED RELEASE ORAL
Refills: 0 | Status: ACTIVE | COMMUNITY
Start: 2023-10-30

## 2023-10-30 RX ORDER — CLONIDINE HYDROCHLORIDE 0.1 MG/1
0.1 TABLET ORAL
Refills: 0 | Status: ACTIVE | COMMUNITY
Start: 2023-10-30

## 2023-10-30 RX ORDER — LOSARTAN POTASSIUM 25 MG/1
25 TABLET, FILM COATED ORAL
Refills: 0 | Status: ACTIVE | COMMUNITY
Start: 2023-10-30

## 2023-10-30 RX ORDER — APIXABAN 5 MG/1
5 TABLET, FILM COATED ORAL
Qty: 180 | Refills: 3 | Status: ACTIVE | COMMUNITY
Start: 2023-10-30

## 2023-10-30 RX ORDER — METOPROLOL TARTRATE 50 MG/1
50 TABLET, FILM COATED ORAL
Refills: 0 | Status: ACTIVE | COMMUNITY
Start: 2023-10-30

## 2023-10-30 RX ORDER — SERTRALINE 25 MG/1
25 TABLET, FILM COATED ORAL
Refills: 0 | Status: ACTIVE | COMMUNITY
Start: 2023-10-30

## 2023-10-30 RX ORDER — ATORVASTATIN CALCIUM 40 MG/1
40 TABLET, FILM COATED ORAL
Refills: 0 | Status: ACTIVE | COMMUNITY
Start: 2023-10-30

## 2023-10-30 RX ORDER — FUROSEMIDE 20 MG/1
20 TABLET ORAL
Refills: 0 | Status: ACTIVE | COMMUNITY
Start: 2023-10-30

## 2023-11-09 ENCOUNTER — TRANSCRIPTION ENCOUNTER (OUTPATIENT)
Age: 88
End: 2023-11-09

## 2023-11-24 ENCOUNTER — TRANSCRIPTION ENCOUNTER (OUTPATIENT)
Age: 88
End: 2023-11-24

## 2023-11-27 ENCOUNTER — TRANSCRIPTION ENCOUNTER (OUTPATIENT)
Age: 88
End: 2023-11-27

## 2023-12-05 ENCOUNTER — TRANSCRIPTION ENCOUNTER (OUTPATIENT)
Age: 88
End: 2023-12-05

## 2023-12-07 ENCOUNTER — TRANSCRIPTION ENCOUNTER (OUTPATIENT)
Age: 88
End: 2023-12-07

## 2023-12-11 ENCOUNTER — RX RENEWAL (OUTPATIENT)
Age: 88
End: 2023-12-11

## 2023-12-11 RX ORDER — MIRTAZAPINE 30 MG/1
30 TABLET, FILM COATED ORAL
Qty: 90 | Refills: 0 | Status: ACTIVE | COMMUNITY
Start: 2023-10-30 | End: 1900-01-01

## 2023-12-27 ENCOUNTER — TRANSCRIPTION ENCOUNTER (OUTPATIENT)
Age: 88
End: 2023-12-27

## 2023-12-27 DIAGNOSIS — R45.1 RESTLESSNESS AND AGITATION: ICD-10-CM

## 2023-12-27 RX ORDER — RISPERIDONE 0.5 MG/1
0.5 TABLET, FILM COATED ORAL
Qty: 30 | Refills: 0 | Status: ACTIVE | COMMUNITY
Start: 2023-10-30 | End: 1900-01-01

## 2024-10-08 ENCOUNTER — APPOINTMENT (OUTPATIENT)
Dept: NEUROLOGY | Facility: CLINIC | Age: 89
End: 2024-10-08